# Patient Record
Sex: FEMALE | Race: WHITE
[De-identification: names, ages, dates, MRNs, and addresses within clinical notes are randomized per-mention and may not be internally consistent; named-entity substitution may affect disease eponyms.]

---

## 2017-07-07 ENCOUNTER — HOSPITAL ENCOUNTER (INPATIENT)
Dept: HOSPITAL 15 - ER | Age: 67
LOS: 1 days | Discharge: HOME | DRG: 441 | End: 2017-07-08
Attending: INTERNAL MEDICINE | Admitting: NURSE PRACTITIONER
Payer: COMMERCIAL

## 2017-07-07 VITALS — SYSTOLIC BLOOD PRESSURE: 134 MMHG | DIASTOLIC BLOOD PRESSURE: 56 MMHG

## 2017-07-07 VITALS — DIASTOLIC BLOOD PRESSURE: 50 MMHG | SYSTOLIC BLOOD PRESSURE: 126 MMHG

## 2017-07-07 VITALS — BODY MASS INDEX: 41.77 KG/M2 | WEIGHT: 259.93 LBS | HEIGHT: 66 IN

## 2017-07-07 DIAGNOSIS — G93.41: ICD-10-CM

## 2017-07-07 DIAGNOSIS — Z87.11: ICD-10-CM

## 2017-07-07 DIAGNOSIS — Z82.49: ICD-10-CM

## 2017-07-07 DIAGNOSIS — B19.20: ICD-10-CM

## 2017-07-07 DIAGNOSIS — I11.0: ICD-10-CM

## 2017-07-07 DIAGNOSIS — I50.9: ICD-10-CM

## 2017-07-07 DIAGNOSIS — Z90.710: ICD-10-CM

## 2017-07-07 DIAGNOSIS — E11.9: ICD-10-CM

## 2017-07-07 DIAGNOSIS — Z83.3: ICD-10-CM

## 2017-07-07 DIAGNOSIS — K72.90: Primary | ICD-10-CM

## 2017-07-07 DIAGNOSIS — I67.2: ICD-10-CM

## 2017-07-07 DIAGNOSIS — J44.9: ICD-10-CM

## 2017-07-07 DIAGNOSIS — N39.0: ICD-10-CM

## 2017-07-07 DIAGNOSIS — I73.9: ICD-10-CM

## 2017-07-07 DIAGNOSIS — Z90.89: ICD-10-CM

## 2017-07-07 DIAGNOSIS — K74.60: ICD-10-CM

## 2017-07-07 DIAGNOSIS — I25.2: ICD-10-CM

## 2017-07-07 DIAGNOSIS — Z82.3: ICD-10-CM

## 2017-07-07 DIAGNOSIS — I85.00: ICD-10-CM

## 2017-07-07 DIAGNOSIS — K42.9: ICD-10-CM

## 2017-07-07 DIAGNOSIS — M19.90: ICD-10-CM

## 2017-07-07 LAB
ALBUMIN SERPL-MCNC: 2.6 G/DL (ref 3.4–5)
ALP SERPL-CCNC: 126 U/L (ref 45–117)
AMYLASE SERPL-CCNC: 55 U/L (ref 25–115)
ANION GAP SERPL CALCULATED.3IONS-SCNC: 9 MMOL/L (ref 5–15)
APTT PPP: 30.1 SEC (ref 22.64–33.71)
BILIRUB SERPL-MCNC: 4.2 MG/DL (ref 0.2–1)
BNP SERPL-MCNC: 265.34 PG/ML (ref 0–100)
BUN SERPL-MCNC: 10 MG/DL (ref 7–18)
BUN/CREAT SERPL: 12.7
CALCIUM SERPL-MCNC: 7.9 MG/DL (ref 8.5–10.1)
CHLORIDE SERPL-SCNC: 111 MMOL/L (ref 98–107)
CO2 SERPL-SCNC: 23 MMOL/L (ref 21–32)
DEFINITIVE: (no result)
GLUCOSE SERPL-MCNC: 90 MG/DL (ref 74–106)
HCT VFR BLD AUTO: 36.1 % (ref 36–46)
HGB BLD-MCNC: 12.1 G/DL (ref 12.2–16.2)
INR PPP: 1.16 (ref 0.9–1.15)
MAGNESIUM SERPL-MCNC: 1.8 MG/DL (ref 1.6–2.6)
MCH RBC QN AUTO: 35.2 PG (ref 28–32)
MCV RBC AUTO: 104.8 FL (ref 80–100)
NRBC BLD QL AUTO: 0 %
POTASSIUM SERPL-SCNC: 4.4 MMOL/L (ref 3.5–5.1)
PROT SERPL-MCNC: 6.6 G/DL (ref 6.4–8.2)
PROTHROMBIN TIME: 12.7 SEC (ref 9.37–12.3)
SODIUM SERPL-SCNC: 143 MMOL/L (ref 136–145)
TEMPERATURE:: 23.5 C (ref 20–25)

## 2017-07-07 PROCEDURE — 80053 COMPREHEN METABOLIC PANEL: CPT

## 2017-07-07 PROCEDURE — 71010: CPT

## 2017-07-07 PROCEDURE — 83735 ASSAY OF MAGNESIUM: CPT

## 2017-07-07 PROCEDURE — 82140 ASSAY OF AMMONIA: CPT

## 2017-07-07 PROCEDURE — 87081 CULTURE SCREEN ONLY: CPT

## 2017-07-07 PROCEDURE — 96375 TX/PRO/DX INJ NEW DRUG ADDON: CPT

## 2017-07-07 PROCEDURE — 85730 THROMBOPLASTIN TIME PARTIAL: CPT

## 2017-07-07 PROCEDURE — 85025 COMPLETE CBC W/AUTO DIFF WBC: CPT

## 2017-07-07 PROCEDURE — 82150 ASSAY OF AMYLASE: CPT

## 2017-07-07 PROCEDURE — 96365 THER/PROPH/DIAG IV INF INIT: CPT

## 2017-07-07 PROCEDURE — 51702 INSERT TEMP BLADDER CATH: CPT

## 2017-07-07 PROCEDURE — 82962 GLUCOSE BLOOD TEST: CPT

## 2017-07-07 PROCEDURE — 85610 PROTHROMBIN TIME: CPT

## 2017-07-07 PROCEDURE — 93005 ELECTROCARDIOGRAM TRACING: CPT

## 2017-07-07 PROCEDURE — 84484 ASSAY OF TROPONIN QUANT: CPT

## 2017-07-07 PROCEDURE — 87040 BLOOD CULTURE FOR BACTERIA: CPT

## 2017-07-07 PROCEDURE — 81001 URINALYSIS AUTO W/SCOPE: CPT

## 2017-07-07 PROCEDURE — 83605 ASSAY OF LACTIC ACID: CPT

## 2017-07-07 PROCEDURE — 83880 ASSAY OF NATRIURETIC PEPTIDE: CPT

## 2017-07-07 PROCEDURE — 74176 CT ABD & PELVIS W/O CONTRAST: CPT

## 2017-07-07 PROCEDURE — 70450 CT HEAD/BRAIN W/O DYE: CPT

## 2017-07-07 PROCEDURE — 36415 COLL VENOUS BLD VENIPUNCTURE: CPT

## 2017-07-07 RX ADMIN — ACETAMINOPHEN PRN MG: 325 TABLET ORAL at 09:47

## 2017-07-07 RX ADMIN — LOSARTAN POTASSIUM SCH MG: 25 TABLET, FILM COATED ORAL at 10:51

## 2017-07-07 RX ADMIN — ACETAMINOPHEN PRN MG: 325 TABLET ORAL at 08:50

## 2017-07-07 RX ADMIN — HUMAN INSULIN SCH UNITS: 100 INJECTION, SOLUTION SUBCUTANEOUS at 18:00

## 2017-07-07 RX ADMIN — Medication SCH ML: at 06:55

## 2017-07-07 RX ADMIN — Medication SCH STRIP: at 06:17

## 2017-07-07 RX ADMIN — METOPROLOL TARTRATE SCH MG: 25 TABLET, FILM COATED ORAL at 21:32

## 2017-07-07 RX ADMIN — HUMAN INSULIN SCH UNITS: 100 INJECTION, SOLUTION SUBCUTANEOUS at 12:00

## 2017-07-07 RX ADMIN — Medication SCH STRIP: at 18:26

## 2017-07-07 RX ADMIN — FUROSEMIDE SCH MG: 20 TABLET ORAL at 10:50

## 2017-07-07 RX ADMIN — HUMAN INSULIN SCH UNITS: 100 INJECTION, SOLUTION SUBCUTANEOUS at 06:00

## 2017-07-07 RX ADMIN — Medication SCH ML: at 10:53

## 2017-07-07 RX ADMIN — METOPROLOL TARTRATE SCH MG: 25 TABLET, FILM COATED ORAL at 10:51

## 2017-07-07 RX ADMIN — Medication SCH STRIP: at 12:27

## 2017-07-07 RX ADMIN — Medication SCH ML: at 21:32

## 2017-07-07 RX ADMIN — PANTOPRAZOLE SODIUM SCH MG: 40 INJECTION, POWDER, FOR SOLUTION INTRAVENOUS at 10:49

## 2017-07-08 VITALS — DIASTOLIC BLOOD PRESSURE: 59 MMHG | SYSTOLIC BLOOD PRESSURE: 138 MMHG

## 2017-07-08 VITALS — SYSTOLIC BLOOD PRESSURE: 144 MMHG | DIASTOLIC BLOOD PRESSURE: 64 MMHG

## 2017-07-08 VITALS — DIASTOLIC BLOOD PRESSURE: 64 MMHG | SYSTOLIC BLOOD PRESSURE: 144 MMHG

## 2017-07-08 VITALS — SYSTOLIC BLOOD PRESSURE: 138 MMHG | DIASTOLIC BLOOD PRESSURE: 59 MMHG

## 2017-07-08 VITALS — SYSTOLIC BLOOD PRESSURE: 128 MMHG | DIASTOLIC BLOOD PRESSURE: 54 MMHG

## 2017-07-08 LAB
ALBUMIN SERPL-MCNC: 2.2 G/DL (ref 3.4–5)
ALP SERPL-CCNC: 113 U/L (ref 45–117)
ANION GAP SERPL CALCULATED.3IONS-SCNC: 8 MMOL/L (ref 5–15)
BILIRUB SERPL-MCNC: 3.5 MG/DL (ref 0.2–1)
BUN SERPL-MCNC: 13 MG/DL (ref 7–18)
BUN/CREAT SERPL: 16.5
CALCIUM SERPL-MCNC: 7.4 MG/DL (ref 8.5–10.1)
CHLORIDE SERPL-SCNC: 114 MMOL/L (ref 98–107)
CO2 SERPL-SCNC: 22 MMOL/L (ref 21–32)
DEFINITIVE: (no result)
GLUCOSE SERPL-MCNC: 87 MG/DL (ref 74–106)
HCT VFR BLD AUTO: 34.8 % (ref 36–46)
HGB BLD-MCNC: 11.6 G/DL (ref 12.2–16.2)
MCH RBC QN AUTO: 35.3 PG (ref 28–32)
MCV RBC AUTO: 106 FL (ref 80–100)
NRBC BLD QL AUTO: 0 %
POTASSIUM SERPL-SCNC: 4.2 MMOL/L (ref 3.5–5.1)
PROT SERPL-MCNC: 5.8 G/DL (ref 6.4–8.2)
SODIUM SERPL-SCNC: 144 MMOL/L (ref 136–145)

## 2017-07-08 RX ADMIN — HUMAN INSULIN SCH UNITS: 100 INJECTION, SOLUTION SUBCUTANEOUS at 00:00

## 2017-07-08 RX ADMIN — Medication SCH ML: at 10:00

## 2017-07-08 RX ADMIN — Medication SCH STRIP: at 00:00

## 2017-07-08 RX ADMIN — Medication SCH STRIP: at 12:14

## 2017-07-08 RX ADMIN — METOPROLOL TARTRATE SCH MG: 25 TABLET, FILM COATED ORAL at 10:01

## 2017-07-08 RX ADMIN — PANTOPRAZOLE SODIUM SCH MG: 40 INJECTION, POWDER, FOR SOLUTION INTRAVENOUS at 10:00

## 2017-07-08 RX ADMIN — LOSARTAN POTASSIUM SCH MG: 25 TABLET, FILM COATED ORAL at 10:02

## 2017-07-08 RX ADMIN — Medication SCH STRIP: at 06:17

## 2017-07-08 RX ADMIN — HUMAN INSULIN SCH UNITS: 100 INJECTION, SOLUTION SUBCUTANEOUS at 06:00

## 2017-07-08 RX ADMIN — FUROSEMIDE SCH MG: 20 TABLET ORAL at 10:02

## 2017-07-08 RX ADMIN — HUMAN INSULIN SCH UNITS: 100 INJECTION, SOLUTION SUBCUTANEOUS at 12:00

## 2017-07-08 RX ADMIN — ACETAMINOPHEN AND CODEINE PHOSPHATE PRN TAB: 300; 30 TABLET ORAL at 10:02

## 2017-07-08 RX ADMIN — ACETAMINOPHEN AND CODEINE PHOSPHATE PRN TAB: 300; 30 TABLET ORAL at 00:36

## 2017-11-05 ENCOUNTER — HOSPITAL ENCOUNTER (INPATIENT)
Dept: HOSPITAL 15 - ER | Age: 67
LOS: 3 days | Discharge: HOME HEALTH | DRG: 441 | End: 2017-11-08
Attending: INTERNAL MEDICINE | Admitting: INTERNAL MEDICINE
Payer: COMMERCIAL

## 2017-11-05 VITALS — BODY MASS INDEX: 40.59 KG/M2 | WEIGHT: 258.6 LBS | HEIGHT: 67 IN

## 2017-11-05 VITALS — DIASTOLIC BLOOD PRESSURE: 93 MMHG | SYSTOLIC BLOOD PRESSURE: 127 MMHG

## 2017-11-05 DIAGNOSIS — G40.909: ICD-10-CM

## 2017-11-05 DIAGNOSIS — R18.8: ICD-10-CM

## 2017-11-05 DIAGNOSIS — N39.0: ICD-10-CM

## 2017-11-05 DIAGNOSIS — E43: ICD-10-CM

## 2017-11-05 DIAGNOSIS — Z82.49: ICD-10-CM

## 2017-11-05 DIAGNOSIS — I50.33: ICD-10-CM

## 2017-11-05 DIAGNOSIS — G92: ICD-10-CM

## 2017-11-05 DIAGNOSIS — E11.9: ICD-10-CM

## 2017-11-05 DIAGNOSIS — B18.2: ICD-10-CM

## 2017-11-05 DIAGNOSIS — Z83.3: ICD-10-CM

## 2017-11-05 DIAGNOSIS — D68.9: ICD-10-CM
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
## 2017-11-05 DIAGNOSIS — I25.2: ICD-10-CM

## 2017-11-05 DIAGNOSIS — D69.6: ICD-10-CM

## 2017-11-05 DIAGNOSIS — K72.90: Primary | ICD-10-CM

## 2017-11-05 DIAGNOSIS — F12.20: ICD-10-CM

## 2017-11-05 DIAGNOSIS — I11.0: ICD-10-CM

## 2017-11-05 DIAGNOSIS — M54.5: ICD-10-CM

## 2017-11-05 DIAGNOSIS — S12.001A: ICD-10-CM

## 2017-11-05 DIAGNOSIS — K57.30: ICD-10-CM
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
## 2017-11-05 DIAGNOSIS — Z87.11: ICD-10-CM

## 2017-11-05 DIAGNOSIS — S12.9XXA: ICD-10-CM

## 2017-11-05 DIAGNOSIS — N17.9: ICD-10-CM

## 2017-11-05 DIAGNOSIS — F15.20: ICD-10-CM

## 2017-11-05 DIAGNOSIS — M51.36: ICD-10-CM

## 2017-11-05 DIAGNOSIS — E66.01: ICD-10-CM

## 2017-11-05 DIAGNOSIS — Z98.1: ICD-10-CM

## 2017-11-05 DIAGNOSIS — K74.60: ICD-10-CM

## 2017-11-05 DIAGNOSIS — J98.11: ICD-10-CM
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
## 2017-11-05 DIAGNOSIS — G89.29: ICD-10-CM

## 2017-11-05 DIAGNOSIS — Z82.3: ICD-10-CM

## 2017-11-05 DIAGNOSIS — M48.02: ICD-10-CM
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
## 2017-11-05 DIAGNOSIS — F41.9: ICD-10-CM

## 2017-11-05 DIAGNOSIS — J18.9: ICD-10-CM

## 2017-11-05 LAB
ALBUMIN SERPL-MCNC: 2.5 G/DL (ref 3.4–5)
ALCOHOL, URINE: 4 MG/DL (ref 0–5)
ALP SERPL-CCNC: 135 U/L (ref 45–117)
ALT SERPL-CCNC: 47 U/L (ref 13–56)
AMPHETAMINES UR QL SCN: POSITIVE
AMYLASE SERPL-CCNC: 54 U/L (ref 25–115)
ANION GAP SERPL CALCULATED.3IONS-SCNC: 8 MMOL/L (ref 5–15)
APTT PPP: 29.2 SEC (ref 22.64–33.71)
BARBITURATES UR QL SCN: NEGATIVE
BENZODIAZ UR QL SCN: NEGATIVE
BILIRUB SERPL-MCNC: 2.8 MG/DL (ref 0.2–1)
BUN SERPL-MCNC: 12 MG/DL (ref 7–18)
BUN/CREAT SERPL: 13.5
BZE UR QL SCN: NEGATIVE
CALCIUM SERPL-MCNC: 8.2 MG/DL (ref 8.5–10.1)
CANNABINOIDS UR QL SCN: POSITIVE
CHLORIDE SERPL-SCNC: 105 MMOL/L (ref 98–107)
CO2 SERPL-SCNC: 26 MMOL/L (ref 21–32)
GLUCOSE SERPL-MCNC: 124 MG/DL (ref 74–106)
HCT VFR BLD AUTO: 37.9 % (ref 36–46)
HGB BLD-MCNC: 13 G/DL (ref 12.2–16.2)
INR PPP: 1.3 (ref 0.9–1.15)
LIPASE SERPL-CCNC: 214 U/L (ref 73–393)
MCH RBC QN AUTO: 35.6 PG (ref 28–32)
MCV RBC AUTO: 103.5 FL (ref 80–100)
NRBC BLD QL AUTO: 0.1 %
OPIATES UR QL SCN: NEGATIVE
PCP UR QL SCN: NEGATIVE
POTASSIUM SERPL-SCNC: 3.6 MMOL/L (ref 3.5–5.1)
PROT SERPL-MCNC: 6.3 G/DL (ref 6.4–8.2)
PROTHROMBIN TIME: 14.2 SEC (ref 9.37–12.3)
SODIUM SERPL-SCNC: 139 MMOL/L (ref 136–145)

## 2017-11-05 PROCEDURE — 87086 URINE CULTURE/COLONY COUNT: CPT

## 2017-11-05 PROCEDURE — 87040 BLOOD CULTURE FOR BACTERIA: CPT

## 2017-11-05 PROCEDURE — 80048 BASIC METABOLIC PNL TOTAL CA: CPT

## 2017-11-05 PROCEDURE — 82140 ASSAY OF AMMONIA: CPT

## 2017-11-05 PROCEDURE — 87077 CULTURE AEROBIC IDENTIFY: CPT

## 2017-11-05 PROCEDURE — 87081 CULTURE SCREEN ONLY: CPT

## 2017-11-05 PROCEDURE — 84484 ASSAY OF TROPONIN QUANT: CPT

## 2017-11-05 PROCEDURE — 80053 COMPREHEN METABOLIC PANEL: CPT

## 2017-11-05 PROCEDURE — 96361 HYDRATE IV INFUSION ADD-ON: CPT

## 2017-11-05 PROCEDURE — 85730 THROMBOPLASTIN TIME PARTIAL: CPT

## 2017-11-05 PROCEDURE — 36415 COLL VENOUS BLD VENIPUNCTURE: CPT

## 2017-11-05 PROCEDURE — 94640 AIRWAY INHALATION TREATMENT: CPT

## 2017-11-05 PROCEDURE — 82150 ASSAY OF AMYLASE: CPT

## 2017-11-05 PROCEDURE — 82746 ASSAY OF FOLIC ACID SERUM: CPT

## 2017-11-05 PROCEDURE — 82607 VITAMIN B-12: CPT

## 2017-11-05 PROCEDURE — 80307 DRUG TEST PRSMV CHEM ANLYZR: CPT

## 2017-11-05 PROCEDURE — 72125 CT NECK SPINE W/O DYE: CPT

## 2017-11-05 PROCEDURE — 87088 URINE BACTERIA CULTURE: CPT

## 2017-11-05 PROCEDURE — 83605 ASSAY OF LACTIC ACID: CPT

## 2017-11-05 PROCEDURE — 93005 ELECTROCARDIOGRAM TRACING: CPT

## 2017-11-05 PROCEDURE — 74176 CT ABD & PELVIS W/O CONTRAST: CPT

## 2017-11-05 PROCEDURE — 70450 CT HEAD/BRAIN W/O DYE: CPT

## 2017-11-05 PROCEDURE — 71010: CPT

## 2017-11-05 PROCEDURE — 87186 SC STD MICRODIL/AGAR DIL: CPT

## 2017-11-05 PROCEDURE — 83735 ASSAY OF MAGNESIUM: CPT

## 2017-11-05 PROCEDURE — 82962 GLUCOSE BLOOD TEST: CPT

## 2017-11-05 PROCEDURE — 84443 ASSAY THYROID STIM HORMONE: CPT

## 2017-11-05 PROCEDURE — 95819 EEG AWAKE AND ASLEEP: CPT

## 2017-11-05 PROCEDURE — 82550 ASSAY OF CK (CPK): CPT

## 2017-11-05 PROCEDURE — 72040 X-RAY EXAM NECK SPINE 2-3 VW: CPT

## 2017-11-05 PROCEDURE — 96365 THER/PROPH/DIAG IV INF INIT: CPT

## 2017-11-05 PROCEDURE — 83880 ASSAY OF NATRIURETIC PEPTIDE: CPT

## 2017-11-05 PROCEDURE — 83036 HEMOGLOBIN GLYCOSYLATED A1C: CPT

## 2017-11-05 PROCEDURE — 51702 INSERT TEMP BLADDER CATH: CPT

## 2017-11-05 PROCEDURE — 80061 LIPID PANEL: CPT

## 2017-11-05 PROCEDURE — 81001 URINALYSIS AUTO W/SCOPE: CPT

## 2017-11-05 PROCEDURE — 85610 PROTHROMBIN TIME: CPT

## 2017-11-05 PROCEDURE — 85025 COMPLETE CBC W/AUTO DIFF WBC: CPT

## 2017-11-05 PROCEDURE — 96375 TX/PRO/DX INJ NEW DRUG ADDON: CPT

## 2017-11-05 PROCEDURE — 83690 ASSAY OF LIPASE: CPT

## 2017-11-05 RX ADMIN — PIPERACILLIN SODIUM AND TAZOBACTAM SODIUM SCH MLS/HR: .375; 3 INJECTION, POWDER, LYOPHILIZED, FOR SOLUTION INTRAVENOUS at 14:58

## 2017-11-05 RX ADMIN — SPIRONOLACTONE SCH MG: 25 TABLET, FILM COATED ORAL at 16:41

## 2017-11-05 RX ADMIN — Medication SCH ML: at 15:05

## 2017-11-05 RX ADMIN — PIPERACILLIN SODIUM AND TAZOBACTAM SODIUM SCH MLS/HR: .375; 3 INJECTION, POWDER, LYOPHILIZED, FOR SOLUTION INTRAVENOUS at 20:34

## 2017-11-05 RX ADMIN — METOPROLOL TARTRATE SCH MG: 25 TABLET, FILM COATED ORAL at 22:18

## 2017-11-05 RX ADMIN — HUMAN INSULIN SCH UNITS: 100 INJECTION, SOLUTION SUBCUTANEOUS at 22:00

## 2017-11-05 RX ADMIN — HUMAN INSULIN SCH UNITS: 100 INJECTION, SOLUTION SUBCUTANEOUS at 17:00

## 2017-11-05 RX ADMIN — Medication SCH STRIP: at 22:00

## 2017-11-05 RX ADMIN — SODIUM CHLORIDE SCH ML: 9 INJECTION INTRAMUSCULAR; INTRAVENOUS; SUBCUTANEOUS at 22:17

## 2017-11-05 RX ADMIN — Medication SCH ML: at 20:34

## 2017-11-05 RX ADMIN — Medication SCH STRIP: at 18:41

## 2017-11-06 VITALS — SYSTOLIC BLOOD PRESSURE: 137 MMHG | DIASTOLIC BLOOD PRESSURE: 48 MMHG

## 2017-11-06 VITALS — DIASTOLIC BLOOD PRESSURE: 88 MMHG | SYSTOLIC BLOOD PRESSURE: 147 MMHG

## 2017-11-06 VITALS — SYSTOLIC BLOOD PRESSURE: 142 MMHG | DIASTOLIC BLOOD PRESSURE: 52 MMHG

## 2017-11-06 VITALS — DIASTOLIC BLOOD PRESSURE: 44 MMHG | SYSTOLIC BLOOD PRESSURE: 136 MMHG

## 2017-11-06 VITALS — SYSTOLIC BLOOD PRESSURE: 113 MMHG | DIASTOLIC BLOOD PRESSURE: 50 MMHG

## 2017-11-06 LAB
ALBUMIN SERPL-MCNC: 2.3 G/DL (ref 3.4–5)
ALP SERPL-CCNC: 113 U/L (ref 45–117)
ALT SERPL-CCNC: 40 U/L (ref 13–56)
ANION GAP SERPL CALCULATED.3IONS-SCNC: 8 MMOL/L (ref 5–15)
BILIRUB SERPL-MCNC: 4.7 MG/DL (ref 0.2–1)
BUN SERPL-MCNC: 20 MG/DL (ref 7–18)
BUN/CREAT SERPL: 15.4
CALCIUM SERPL-MCNC: 7.8 MG/DL (ref 8.5–10.1)
CHLORIDE SERPL-SCNC: 104 MMOL/L (ref 98–107)
CHOLEST SERPL-MCNC: 108 MG/DL (ref ?–200)
CO2 SERPL-SCNC: 27 MMOL/L (ref 21–32)
GLUCOSE SERPL-MCNC: 122 MG/DL (ref 74–106)
HCT VFR BLD AUTO: 36.3 % (ref 36–46)
HDLC SERPL-MCNC: 25 MG/DL (ref 40–59)
HGB BLD-MCNC: 12.6 G/DL (ref 12.2–16.2)
MCH RBC QN AUTO: 36.2 PG (ref 28–32)
MCV RBC AUTO: 104.6 FL (ref 80–100)
NRBC BLD QL AUTO: 0.1 %
POTASSIUM SERPL-SCNC: 3.6 MMOL/L (ref 3.5–5.1)
PROT SERPL-MCNC: 6 G/DL (ref 6.4–8.2)
SODIUM SERPL-SCNC: 139 MMOL/L (ref 136–145)
TRIGL SERPL-MCNC: 94 MG/DL (ref ?–150)

## 2017-11-06 RX ADMIN — HUMAN INSULIN SCH UNITS: 100 INJECTION, SOLUTION SUBCUTANEOUS at 17:00

## 2017-11-06 RX ADMIN — METOPROLOL TARTRATE SCH MG: 25 TABLET, FILM COATED ORAL at 21:55

## 2017-11-06 RX ADMIN — LOSARTAN POTASSIUM SCH MG: 25 TABLET, FILM COATED ORAL at 15:28

## 2017-11-06 RX ADMIN — Medication SCH STRIP: at 22:08

## 2017-11-06 RX ADMIN — SODIUM CHLORIDE PRN MG: 9 INJECTION, SOLUTION INTRAVENOUS at 19:10

## 2017-11-06 RX ADMIN — Medication SCH STRIP: at 11:30

## 2017-11-06 RX ADMIN — Medication SCH STRIP: at 17:00

## 2017-11-06 RX ADMIN — PIPERACILLIN SODIUM AND TAZOBACTAM SODIUM SCH MLS/HR: .375; 3 INJECTION, POWDER, LYOPHILIZED, FOR SOLUTION INTRAVENOUS at 17:00

## 2017-11-06 RX ADMIN — HUMAN INSULIN SCH UNITS: 100 INJECTION, SOLUTION SUBCUTANEOUS at 22:09

## 2017-11-06 RX ADMIN — Medication SCH ML: at 03:11

## 2017-11-06 RX ADMIN — METOPROLOL TARTRATE SCH MG: 25 TABLET, FILM COATED ORAL at 15:29

## 2017-11-06 RX ADMIN — SODIUM CHLORIDE SCH ML: 9 INJECTION INTRAMUSCULAR; INTRAVENOUS; SUBCUTANEOUS at 21:55

## 2017-11-06 RX ADMIN — HUMAN INSULIN SCH UNITS: 100 INJECTION, SOLUTION SUBCUTANEOUS at 11:30

## 2017-11-06 RX ADMIN — SODIUM CHLORIDE SCH ML: 9 INJECTION INTRAMUSCULAR; INTRAVENOUS; SUBCUTANEOUS at 14:00

## 2017-11-06 RX ADMIN — Medication SCH ML: at 20:48

## 2017-11-06 RX ADMIN — SPIRONOLACTONE SCH MG: 25 TABLET, FILM COATED ORAL at 06:28

## 2017-11-06 RX ADMIN — SODIUM CHLORIDE PRN MG: 9 INJECTION, SOLUTION INTRAVENOUS at 09:37

## 2017-11-06 RX ADMIN — PIPERACILLIN SODIUM AND TAZOBACTAM SODIUM SCH MLS/HR: .375; 3 INJECTION, POWDER, LYOPHILIZED, FOR SOLUTION INTRAVENOUS at 14:55

## 2017-11-06 RX ADMIN — PIPERACILLIN SODIUM AND TAZOBACTAM SODIUM SCH MLS/HR: .375; 3 INJECTION, POWDER, LYOPHILIZED, FOR SOLUTION INTRAVENOUS at 03:11

## 2017-11-06 RX ADMIN — Medication SCH STRIP: at 07:11

## 2017-11-06 RX ADMIN — SPIRONOLACTONE SCH MG: 25 TABLET, FILM COATED ORAL at 18:00

## 2017-11-06 RX ADMIN — Medication SCH ML: at 09:27

## 2017-11-06 RX ADMIN — HUMAN INSULIN SCH UNITS: 100 INJECTION, SOLUTION SUBCUTANEOUS at 07:00

## 2017-11-06 RX ADMIN — Medication SCH ML: at 14:30

## 2017-11-06 RX ADMIN — PANTOPRAZOLE SODIUM SCH MG: 40 TABLET, DELAYED RELEASE ORAL at 15:29

## 2017-11-06 RX ADMIN — PIPERACILLIN SODIUM AND TAZOBACTAM SODIUM SCH MLS/HR: .375; 3 INJECTION, POWDER, LYOPHILIZED, FOR SOLUTION INTRAVENOUS at 20:48

## 2017-11-06 RX ADMIN — BUDESONIDE SCH MG: 0.5 SUSPENSION RESPIRATORY (INHALATION) at 22:00

## 2017-11-06 RX ADMIN — SODIUM CHLORIDE SCH ML: 9 INJECTION INTRAMUSCULAR; INTRAVENOUS; SUBCUTANEOUS at 06:26

## 2017-11-07 VITALS — SYSTOLIC BLOOD PRESSURE: 120 MMHG | DIASTOLIC BLOOD PRESSURE: 94 MMHG

## 2017-11-07 VITALS — SYSTOLIC BLOOD PRESSURE: 129 MMHG | DIASTOLIC BLOOD PRESSURE: 43 MMHG

## 2017-11-07 VITALS — SYSTOLIC BLOOD PRESSURE: 120 MMHG | DIASTOLIC BLOOD PRESSURE: 55 MMHG

## 2017-11-07 VITALS — SYSTOLIC BLOOD PRESSURE: 118 MMHG | DIASTOLIC BLOOD PRESSURE: 46 MMHG

## 2017-11-07 VITALS — DIASTOLIC BLOOD PRESSURE: 54 MMHG | SYSTOLIC BLOOD PRESSURE: 129 MMHG

## 2017-11-07 VITALS — DIASTOLIC BLOOD PRESSURE: 44 MMHG | SYSTOLIC BLOOD PRESSURE: 136 MMHG

## 2017-11-07 LAB
ANION GAP SERPL CALCULATED.3IONS-SCNC: 7 MMOL/L (ref 5–15)
BUN SERPL-MCNC: 23 MG/DL (ref 7–18)
BUN/CREAT SERPL: 18.1
CALCIUM SERPL-MCNC: 7.7 MG/DL (ref 8.5–10.1)
CHLORIDE SERPL-SCNC: 104 MMOL/L (ref 98–107)
CO2 SERPL-SCNC: 28 MMOL/L (ref 21–32)
GLUCOSE SERPL-MCNC: 117 MG/DL (ref 74–106)
HCT VFR BLD AUTO: 34.2 % (ref 36–46)
HGB BLD-MCNC: 11.9 G/DL (ref 12.2–16.2)
MAGNESIUM SERPL-MCNC: 2 MG/DL (ref 1.6–2.6)
MCH RBC QN AUTO: 36.7 PG (ref 28–32)
MCV RBC AUTO: 105.1 FL (ref 80–100)
NRBC BLD QL AUTO: 0.1 %
POTASSIUM SERPL-SCNC: 3.5 MMOL/L (ref 3.5–5.1)
SODIUM SERPL-SCNC: 139 MMOL/L (ref 136–145)

## 2017-11-07 RX ADMIN — PANTOPRAZOLE SODIUM SCH MG: 40 TABLET, DELAYED RELEASE ORAL at 10:00

## 2017-11-07 RX ADMIN — Medication SCH ML: at 08:50

## 2017-11-07 RX ADMIN — Medication SCH STRIP: at 18:22

## 2017-11-07 RX ADMIN — BUDESONIDE SCH MG: 0.5 SUSPENSION RESPIRATORY (INHALATION) at 10:19

## 2017-11-07 RX ADMIN — Medication SCH STRIP: at 11:30

## 2017-11-07 RX ADMIN — FUROSEMIDE SCH MG: 10 INJECTION, SOLUTION INTRAMUSCULAR; INTRAVENOUS at 08:50

## 2017-11-07 RX ADMIN — METOPROLOL TARTRATE SCH MG: 25 TABLET, FILM COATED ORAL at 21:45

## 2017-11-07 RX ADMIN — HUMAN INSULIN SCH UNITS: 100 INJECTION, SOLUTION SUBCUTANEOUS at 17:00

## 2017-11-07 RX ADMIN — HUMAN INSULIN SCH UNITS: 100 INJECTION, SOLUTION SUBCUTANEOUS at 22:00

## 2017-11-07 RX ADMIN — SODIUM CHLORIDE SCH ML: 9 INJECTION INTRAMUSCULAR; INTRAVENOUS; SUBCUTANEOUS at 21:45

## 2017-11-07 RX ADMIN — Medication SCH ML: at 14:30

## 2017-11-07 RX ADMIN — Medication SCH ML: at 03:46

## 2017-11-07 RX ADMIN — SODIUM CHLORIDE SCH ML: 9 INJECTION INTRAMUSCULAR; INTRAVENOUS; SUBCUTANEOUS at 18:20

## 2017-11-07 RX ADMIN — Medication SCH ML: at 20:30

## 2017-11-07 RX ADMIN — PIPERACILLIN SODIUM AND TAZOBACTAM SODIUM SCH MLS/HR: .375; 3 INJECTION, POWDER, LYOPHILIZED, FOR SOLUTION INTRAVENOUS at 08:49

## 2017-11-07 RX ADMIN — METOPROLOL TARTRATE SCH MG: 25 TABLET, FILM COATED ORAL at 08:51

## 2017-11-07 RX ADMIN — HUMAN INSULIN SCH UNITS: 100 INJECTION, SOLUTION SUBCUTANEOUS at 07:00

## 2017-11-07 RX ADMIN — SPIRONOLACTONE SCH MG: 25 TABLET, FILM COATED ORAL at 19:52

## 2017-11-07 RX ADMIN — LOSARTAN POTASSIUM SCH MG: 25 TABLET, FILM COATED ORAL at 08:51

## 2017-11-07 RX ADMIN — PIPERACILLIN SODIUM AND TAZOBACTAM SODIUM SCH MLS/HR: .375; 3 INJECTION, POWDER, LYOPHILIZED, FOR SOLUTION INTRAVENOUS at 03:45

## 2017-11-07 RX ADMIN — Medication SCH STRIP: at 07:00

## 2017-11-07 RX ADMIN — VANCOMYCIN HYDROCHLORIDE SCH MLS/HR: 1 INJECTION, POWDER, LYOPHILIZED, FOR SOLUTION INTRAVENOUS at 11:55

## 2017-11-07 RX ADMIN — BUDESONIDE SCH MG: 0.5 SUSPENSION RESPIRATORY (INHALATION) at 18:57

## 2017-11-07 RX ADMIN — HUMAN INSULIN SCH UNITS: 100 INJECTION, SOLUTION SUBCUTANEOUS at 11:30

## 2017-11-07 RX ADMIN — SODIUM CHLORIDE PRN MG: 9 INJECTION, SOLUTION INTRAVENOUS at 08:51

## 2017-11-07 RX ADMIN — SODIUM CHLORIDE SCH ML: 9 INJECTION INTRAMUSCULAR; INTRAVENOUS; SUBCUTANEOUS at 05:56

## 2017-11-07 RX ADMIN — SPIRONOLACTONE SCH MG: 25 TABLET, FILM COATED ORAL at 05:56

## 2017-11-07 RX ADMIN — Medication SCH STRIP: at 22:00

## 2017-11-07 RX ADMIN — VANCOMYCIN HYDROCHLORIDE SCH MLS/HR: 1 INJECTION, POWDER, LYOPHILIZED, FOR SOLUTION INTRAVENOUS at 23:00

## 2017-11-08 VITALS — DIASTOLIC BLOOD PRESSURE: 55 MMHG | SYSTOLIC BLOOD PRESSURE: 158 MMHG

## 2017-11-08 VITALS — DIASTOLIC BLOOD PRESSURE: 69 MMHG | SYSTOLIC BLOOD PRESSURE: 159 MMHG

## 2017-11-08 VITALS — SYSTOLIC BLOOD PRESSURE: 159 MMHG | DIASTOLIC BLOOD PRESSURE: 69 MMHG

## 2017-11-08 VITALS — SYSTOLIC BLOOD PRESSURE: 133 MMHG | DIASTOLIC BLOOD PRESSURE: 63 MMHG

## 2017-11-08 VITALS — DIASTOLIC BLOOD PRESSURE: 52 MMHG | SYSTOLIC BLOOD PRESSURE: 134 MMHG

## 2017-11-08 RX ADMIN — BUDESONIDE SCH MG: 0.5 SUSPENSION RESPIRATORY (INHALATION) at 11:00

## 2017-11-08 RX ADMIN — VANCOMYCIN HYDROCHLORIDE SCH MLS/HR: 1 INJECTION, POWDER, LYOPHILIZED, FOR SOLUTION INTRAVENOUS at 11:19

## 2017-11-08 RX ADMIN — Medication SCH STRIP: at 11:30

## 2017-11-08 RX ADMIN — Medication SCH ML: at 12:00

## 2017-11-08 RX ADMIN — PANTOPRAZOLE SODIUM SCH MG: 40 TABLET, DELAYED RELEASE ORAL at 08:57

## 2017-11-08 RX ADMIN — Medication SCH ML: at 02:30

## 2017-11-08 RX ADMIN — LOSARTAN POTASSIUM SCH MG: 25 TABLET, FILM COATED ORAL at 08:57

## 2017-11-08 RX ADMIN — SPIRONOLACTONE SCH MG: 25 TABLET, FILM COATED ORAL at 06:25

## 2017-11-08 RX ADMIN — Medication SCH STRIP: at 06:26

## 2017-11-08 RX ADMIN — HUMAN INSULIN SCH UNITS: 100 INJECTION, SOLUTION SUBCUTANEOUS at 06:25

## 2017-11-08 RX ADMIN — SODIUM CHLORIDE SCH ML: 9 INJECTION INTRAMUSCULAR; INTRAVENOUS; SUBCUTANEOUS at 14:00

## 2017-11-08 RX ADMIN — HUMAN INSULIN SCH UNITS: 100 INJECTION, SOLUTION SUBCUTANEOUS at 11:30

## 2017-11-08 RX ADMIN — SODIUM CHLORIDE PRN MG: 9 INJECTION, SOLUTION INTRAVENOUS at 15:53

## 2017-11-08 RX ADMIN — FUROSEMIDE SCH MG: 10 INJECTION, SOLUTION INTRAMUSCULAR; INTRAVENOUS at 10:38

## 2017-11-08 RX ADMIN — SODIUM CHLORIDE SCH ML: 9 INJECTION INTRAMUSCULAR; INTRAVENOUS; SUBCUTANEOUS at 06:25

## 2017-11-08 RX ADMIN — Medication SCH ML: at 06:00

## 2017-12-31 ENCOUNTER — HOSPITAL ENCOUNTER (INPATIENT)
Dept: HOSPITAL 15 - ER | Age: 67
LOS: 3 days | Discharge: HOME HEALTH | DRG: 871 | End: 2018-01-03
Attending: HOSPITALIST | Admitting: INTERNAL MEDICINE
Payer: COMMERCIAL

## 2017-12-31 VITALS — DIASTOLIC BLOOD PRESSURE: 61 MMHG | SYSTOLIC BLOOD PRESSURE: 136 MMHG

## 2017-12-31 VITALS — DIASTOLIC BLOOD PRESSURE: 54 MMHG | SYSTOLIC BLOOD PRESSURE: 138 MMHG

## 2017-12-31 VITALS — SYSTOLIC BLOOD PRESSURE: 136 MMHG | DIASTOLIC BLOOD PRESSURE: 61 MMHG

## 2017-12-31 VITALS — SYSTOLIC BLOOD PRESSURE: 138 MMHG | DIASTOLIC BLOOD PRESSURE: 54 MMHG

## 2017-12-31 VITALS — BODY MASS INDEX: 40.14 KG/M2 | WEIGHT: 255.74 LBS | HEIGHT: 67 IN

## 2017-12-31 DIAGNOSIS — B19.20: ICD-10-CM

## 2017-12-31 DIAGNOSIS — K72.90: ICD-10-CM

## 2017-12-31 DIAGNOSIS — Z82.49: ICD-10-CM

## 2017-12-31 DIAGNOSIS — Z90.710: ICD-10-CM

## 2017-12-31 DIAGNOSIS — G89.29: ICD-10-CM

## 2017-12-31 DIAGNOSIS — K74.60: ICD-10-CM

## 2017-12-31 DIAGNOSIS — F41.9: ICD-10-CM

## 2017-12-31 DIAGNOSIS — M19.90: ICD-10-CM

## 2017-12-31 DIAGNOSIS — Z71.89: ICD-10-CM

## 2017-12-31 DIAGNOSIS — E66.01: ICD-10-CM

## 2017-12-31 DIAGNOSIS — K57.30: ICD-10-CM

## 2017-12-31 DIAGNOSIS — Z90.89: ICD-10-CM

## 2017-12-31 DIAGNOSIS — G40.909: ICD-10-CM

## 2017-12-31 DIAGNOSIS — E43: ICD-10-CM

## 2017-12-31 DIAGNOSIS — D69.6: ICD-10-CM

## 2017-12-31 DIAGNOSIS — E11.9: ICD-10-CM

## 2017-12-31 DIAGNOSIS — K76.6: ICD-10-CM

## 2017-12-31 DIAGNOSIS — B96.20: ICD-10-CM

## 2017-12-31 DIAGNOSIS — Z98.1: ICD-10-CM

## 2017-12-31 DIAGNOSIS — I25.2: ICD-10-CM

## 2017-12-31 DIAGNOSIS — A41.9: Primary | ICD-10-CM

## 2017-12-31 DIAGNOSIS — Z82.3: ICD-10-CM

## 2017-12-31 DIAGNOSIS — Z83.3: ICD-10-CM

## 2017-12-31 DIAGNOSIS — G93.41: ICD-10-CM

## 2017-12-31 DIAGNOSIS — F12.90: ICD-10-CM

## 2017-12-31 DIAGNOSIS — Z91.19: ICD-10-CM

## 2017-12-31 DIAGNOSIS — E86.0: ICD-10-CM

## 2017-12-31 DIAGNOSIS — I11.0: ICD-10-CM

## 2017-12-31 DIAGNOSIS — Z87.11: ICD-10-CM

## 2017-12-31 DIAGNOSIS — M54.5: ICD-10-CM

## 2017-12-31 DIAGNOSIS — N30.90: ICD-10-CM

## 2017-12-31 DIAGNOSIS — I50.9: ICD-10-CM

## 2017-12-31 DIAGNOSIS — Z79.899: ICD-10-CM

## 2017-12-31 LAB
ALBUMIN SERPL-MCNC: 2.8 G/DL (ref 3.4–5)
ALP SERPL-CCNC: 135 U/L (ref 45–117)
ALT SERPL-CCNC: 48 U/L (ref 13–56)
AMYLASE SERPL-CCNC: 55 U/L (ref 25–115)
ANION GAP SERPL CALCULATED.3IONS-SCNC: 10 MMOL/L (ref 5–15)
APTT PPP: 41.1 SEC (ref 22.64–33.71)
BILIRUB SERPL-MCNC: 3.8 MG/DL (ref 0.2–1)
BUN SERPL-MCNC: 15 MG/DL (ref 7–18)
BUN/CREAT SERPL: 14.4
CALCIUM SERPL-MCNC: 8.3 MG/DL (ref 8.5–10.1)
CHLORIDE SERPL-SCNC: 109 MMOL/L (ref 98–107)
CO2 SERPL-SCNC: 24 MMOL/L (ref 21–32)
GLUCOSE SERPL-MCNC: 130 MG/DL (ref 74–106)
HCT VFR BLD AUTO: 38.7 % (ref 36–46)
HGB BLD-MCNC: 13.2 G/DL (ref 12.2–16.2)
INR PPP: 1.55 (ref 0.9–1.15)
LIPASE SERPL-CCNC: 123 U/L (ref 73–393)
MAGNESIUM SERPL-MCNC: 1.8 MG/DL (ref 1.6–2.6)
MCH RBC QN AUTO: 35.9 PG (ref 28–32)
MCV RBC AUTO: 105.1 FL (ref 80–100)
NRBC BLD QL AUTO: 0 %
POTASSIUM SERPL-SCNC: 3.5 MMOL/L (ref 3.5–5.1)
PROT SERPL-MCNC: 7.5 G/DL (ref 6.4–8.2)
PROTHROMBIN TIME: 17 SEC (ref 9.37–12.3)
SODIUM SERPL-SCNC: 143 MMOL/L (ref 136–145)
YEAST # UR AUTO: (no result) /HPF

## 2017-12-31 PROCEDURE — 70450 CT HEAD/BRAIN W/O DYE: CPT

## 2017-12-31 PROCEDURE — 82550 ASSAY OF CK (CPK): CPT

## 2017-12-31 PROCEDURE — 71010: CPT

## 2017-12-31 PROCEDURE — 95819 EEG AWAKE AND ASLEEP: CPT

## 2017-12-31 PROCEDURE — 96361 HYDRATE IV INFUSION ADD-ON: CPT

## 2017-12-31 PROCEDURE — 87081 CULTURE SCREEN ONLY: CPT

## 2017-12-31 PROCEDURE — 36415 COLL VENOUS BLD VENIPUNCTURE: CPT

## 2017-12-31 PROCEDURE — 82150 ASSAY OF AMYLASE: CPT

## 2017-12-31 PROCEDURE — 82746 ASSAY OF FOLIC ACID SERUM: CPT

## 2017-12-31 PROCEDURE — 85730 THROMBOPLASTIN TIME PARTIAL: CPT

## 2017-12-31 PROCEDURE — 84484 ASSAY OF TROPONIN QUANT: CPT

## 2017-12-31 PROCEDURE — 85610 PROTHROMBIN TIME: CPT

## 2017-12-31 PROCEDURE — 96374 THER/PROPH/DIAG INJ IV PUSH: CPT

## 2017-12-31 PROCEDURE — 81001 URINALYSIS AUTO W/SCOPE: CPT

## 2017-12-31 PROCEDURE — 74176 CT ABD & PELVIS W/O CONTRAST: CPT

## 2017-12-31 PROCEDURE — 82607 VITAMIN B-12: CPT

## 2017-12-31 PROCEDURE — 83690 ASSAY OF LIPASE: CPT

## 2017-12-31 PROCEDURE — 87186 SC STD MICRODIL/AGAR DIL: CPT

## 2017-12-31 PROCEDURE — 83735 ASSAY OF MAGNESIUM: CPT

## 2017-12-31 PROCEDURE — 87493 C DIFF AMPLIFIED PROBE: CPT

## 2017-12-31 PROCEDURE — 80053 COMPREHEN METABOLIC PANEL: CPT

## 2017-12-31 PROCEDURE — 87088 URINE BACTERIA CULTURE: CPT

## 2017-12-31 PROCEDURE — 80048 BASIC METABOLIC PNL TOTAL CA: CPT

## 2017-12-31 PROCEDURE — 82962 GLUCOSE BLOOD TEST: CPT

## 2017-12-31 PROCEDURE — 93005 ELECTROCARDIOGRAM TRACING: CPT

## 2017-12-31 PROCEDURE — 85025 COMPLETE CBC W/AUTO DIFF WBC: CPT

## 2017-12-31 PROCEDURE — 87086 URINE CULTURE/COLONY COUNT: CPT

## 2017-12-31 PROCEDURE — 97163 PT EVAL HIGH COMPLEX 45 MIN: CPT

## 2017-12-31 PROCEDURE — 84443 ASSAY THYROID STIM HORMONE: CPT

## 2017-12-31 PROCEDURE — 96375 TX/PRO/DX INJ NEW DRUG ADDON: CPT

## 2017-12-31 PROCEDURE — 82140 ASSAY OF AMMONIA: CPT

## 2017-12-31 RX ADMIN — Medication SCH ML: at 20:27

## 2017-12-31 RX ADMIN — Medication SCH ML: at 20:00

## 2017-12-31 RX ADMIN — PANTOPRAZOLE SODIUM SCH MG: 40 INJECTION, POWDER, FOR SOLUTION INTRAVENOUS at 22:00

## 2017-12-31 RX ADMIN — PROMETHAZINE HYDROCHLORIDE PRN MG: 25 INJECTION INTRAMUSCULAR; INTRAVENOUS at 20:42

## 2017-12-31 RX ADMIN — Medication SCH ML: at 17:00

## 2017-12-31 RX ADMIN — METOPROLOL TARTRATE SCH MG: 25 TABLET, FILM COATED ORAL at 22:00

## 2018-01-01 VITALS — DIASTOLIC BLOOD PRESSURE: 57 MMHG | SYSTOLIC BLOOD PRESSURE: 130 MMHG

## 2018-01-01 VITALS — DIASTOLIC BLOOD PRESSURE: 61 MMHG | SYSTOLIC BLOOD PRESSURE: 143 MMHG

## 2018-01-01 VITALS — SYSTOLIC BLOOD PRESSURE: 120 MMHG | DIASTOLIC BLOOD PRESSURE: 41 MMHG

## 2018-01-01 VITALS — DIASTOLIC BLOOD PRESSURE: 61 MMHG | SYSTOLIC BLOOD PRESSURE: 131 MMHG

## 2018-01-01 VITALS — SYSTOLIC BLOOD PRESSURE: 114 MMHG | DIASTOLIC BLOOD PRESSURE: 59 MMHG

## 2018-01-01 LAB
ALBUMIN SERPL-MCNC: 2.3 G/DL (ref 3.4–5)
ALP SERPL-CCNC: 112 U/L (ref 45–117)
ALT SERPL-CCNC: 38 U/L (ref 13–56)
ANION GAP SERPL CALCULATED.3IONS-SCNC: 7 MMOL/L (ref 5–15)
BILIRUB SERPL-MCNC: 3.6 MG/DL (ref 0.2–1)
BUN SERPL-MCNC: 22 MG/DL (ref 7–18)
BUN/CREAT SERPL: 21.2
CALCIUM SERPL-MCNC: 7.7 MG/DL (ref 8.5–10.1)
CHLORIDE SERPL-SCNC: 114 MMOL/L (ref 98–107)
CO2 SERPL-SCNC: 23 MMOL/L (ref 21–32)
GLUCOSE SERPL-MCNC: 105 MG/DL (ref 74–106)
HCT VFR BLD AUTO: 36.9 % (ref 36–46)
HGB BLD-MCNC: 12.5 G/DL (ref 12.2–16.2)
MCH RBC QN AUTO: 36.4 PG (ref 28–32)
MCV RBC AUTO: 107.7 FL (ref 80–100)
NRBC BLD QL AUTO: 0.2 %
POTASSIUM SERPL-SCNC: 3.8 MMOL/L (ref 3.5–5.1)
PROT SERPL-MCNC: 6.3 G/DL (ref 6.4–8.2)
SODIUM SERPL-SCNC: 144 MMOL/L (ref 136–145)

## 2018-01-01 RX ADMIN — Medication SCH ML: at 12:24

## 2018-01-01 RX ADMIN — Medication SCH ML: at 20:26

## 2018-01-01 RX ADMIN — Medication SCH ML: at 08:38

## 2018-01-01 RX ADMIN — Medication SCH ML: at 04:00

## 2018-01-01 RX ADMIN — LOSARTAN POTASSIUM SCH MG: 25 TABLET, FILM COATED ORAL at 10:48

## 2018-01-01 RX ADMIN — METOPROLOL TARTRATE SCH MG: 25 TABLET, FILM COATED ORAL at 10:48

## 2018-01-01 RX ADMIN — PROMETHAZINE HYDROCHLORIDE PRN MG: 25 INJECTION INTRAMUSCULAR; INTRAVENOUS at 04:29

## 2018-01-01 RX ADMIN — SODIUM CHLORIDE PRN MG: 9 INJECTION, SOLUTION INTRAVENOUS at 20:46

## 2018-01-01 RX ADMIN — Medication SCH ML: at 16:16

## 2018-01-01 RX ADMIN — METOPROLOL TARTRATE SCH MG: 25 TABLET, FILM COATED ORAL at 22:24

## 2018-01-01 RX ADMIN — SODIUM CHLORIDE PRN MG: 9 INJECTION, SOLUTION INTRAVENOUS at 16:28

## 2018-01-01 RX ADMIN — Medication SCH ML: at 00:00

## 2018-01-01 RX ADMIN — PANTOPRAZOLE SODIUM SCH MG: 40 INJECTION, POWDER, FOR SOLUTION INTRAVENOUS at 10:47

## 2018-01-01 RX ADMIN — PANTOPRAZOLE SODIUM SCH MG: 40 INJECTION, POWDER, FOR SOLUTION INTRAVENOUS at 22:23

## 2018-01-01 RX ADMIN — CEFTRIAXONE SODIUM SCH MLS/HR: 1 INJECTION, POWDER, FOR SOLUTION INTRAMUSCULAR; INTRAVENOUS at 08:38

## 2018-01-01 RX ADMIN — SODIUM CHLORIDE PRN MG: 9 INJECTION, SOLUTION INTRAVENOUS at 04:29

## 2018-01-01 RX ADMIN — SODIUM CHLORIDE PRN MG: 9 INJECTION, SOLUTION INTRAVENOUS at 08:39

## 2018-01-01 RX ADMIN — PROMETHAZINE HYDROCHLORIDE PRN MG: 25 INJECTION INTRAMUSCULAR; INTRAVENOUS at 20:38

## 2018-01-02 VITALS — SYSTOLIC BLOOD PRESSURE: 125 MMHG | DIASTOLIC BLOOD PRESSURE: 55 MMHG

## 2018-01-02 VITALS — SYSTOLIC BLOOD PRESSURE: 117 MMHG | DIASTOLIC BLOOD PRESSURE: 46 MMHG

## 2018-01-02 VITALS — DIASTOLIC BLOOD PRESSURE: 39 MMHG | SYSTOLIC BLOOD PRESSURE: 103 MMHG

## 2018-01-02 VITALS — SYSTOLIC BLOOD PRESSURE: 106 MMHG | DIASTOLIC BLOOD PRESSURE: 59 MMHG

## 2018-01-02 VITALS — SYSTOLIC BLOOD PRESSURE: 106 MMHG | DIASTOLIC BLOOD PRESSURE: 48 MMHG

## 2018-01-02 LAB
ANION GAP SERPL CALCULATED.3IONS-SCNC: 6 MMOL/L (ref 5–15)
ANION GAP SERPL CALCULATED.3IONS-SCNC: 8 MMOL/L (ref 5–15)
BUN SERPL-MCNC: 29 MG/DL (ref 7–18)
BUN SERPL-MCNC: 33 MG/DL (ref 7–18)
BUN/CREAT SERPL: 14.5
BUN/CREAT SERPL: 15.9
CALCIUM SERPL-MCNC: 7.1 MG/DL (ref 8.5–10.1)
CALCIUM SERPL-MCNC: 7.2 MG/DL (ref 8.5–10.1)
CHLORIDE SERPL-SCNC: 108 MMOL/L (ref 98–107)
CHLORIDE SERPL-SCNC: 109 MMOL/L (ref 98–107)
CO2 SERPL-SCNC: 22 MMOL/L (ref 21–32)
CO2 SERPL-SCNC: 23 MMOL/L (ref 21–32)
GLUCOSE SERPL-MCNC: 124 MG/DL (ref 74–106)
GLUCOSE SERPL-MCNC: 129 MG/DL (ref 74–106)
POTASSIUM SERPL-SCNC: 3.9 MMOL/L (ref 3.5–5.1)
POTASSIUM SERPL-SCNC: 4 MMOL/L (ref 3.5–5.1)
SODIUM SERPL-SCNC: 137 MMOL/L (ref 136–145)
SODIUM SERPL-SCNC: 139 MMOL/L (ref 136–145)

## 2018-01-02 RX ADMIN — METOPROLOL TARTRATE SCH MG: 25 TABLET, FILM COATED ORAL at 09:51

## 2018-01-02 RX ADMIN — SODIUM CHLORIDE SCH MLS/HR: 0.9 INJECTION, SOLUTION INTRAVENOUS at 18:30

## 2018-01-02 RX ADMIN — PROMETHAZINE HYDROCHLORIDE PRN MG: 25 INJECTION INTRAMUSCULAR; INTRAVENOUS at 02:16

## 2018-01-02 RX ADMIN — SODIUM CHLORIDE SCH MLS/HR: 0.9 INJECTION, SOLUTION INTRAVENOUS at 09:30

## 2018-01-02 RX ADMIN — SODIUM CHLORIDE PRN MG: 9 INJECTION, SOLUTION INTRAVENOUS at 10:35

## 2018-01-02 RX ADMIN — Medication SCH ML: at 16:18

## 2018-01-02 RX ADMIN — Medication SCH ML: at 08:47

## 2018-01-02 RX ADMIN — Medication SCH ML: at 12:52

## 2018-01-02 RX ADMIN — Medication SCH ML: at 04:00

## 2018-01-02 RX ADMIN — SODIUM CHLORIDE PRN MG: 9 INJECTION, SOLUTION INTRAVENOUS at 02:15

## 2018-01-02 RX ADMIN — Medication SCH ML: at 00:13

## 2018-01-02 RX ADMIN — Medication SCH ML: at 20:04

## 2018-01-02 RX ADMIN — SODIUM CHLORIDE SCH MLS/HR: 0.9 INJECTION, SOLUTION INTRAVENOUS at 01:30

## 2018-01-02 RX ADMIN — CEFTRIAXONE SODIUM SCH MLS/HR: 1 INJECTION, POWDER, FOR SOLUTION INTRAMUSCULAR; INTRAVENOUS at 08:47

## 2018-01-02 RX ADMIN — LOSARTAN POTASSIUM SCH MG: 25 TABLET, FILM COATED ORAL at 09:51

## 2018-01-02 RX ADMIN — METOPROLOL TARTRATE SCH MG: 25 TABLET, FILM COATED ORAL at 21:31

## 2018-01-02 RX ADMIN — PANTOPRAZOLE SODIUM SCH MG: 40 INJECTION, POWDER, FOR SOLUTION INTRAVENOUS at 09:50

## 2018-01-03 VITALS — SYSTOLIC BLOOD PRESSURE: 138 MMHG | DIASTOLIC BLOOD PRESSURE: 54 MMHG

## 2018-01-03 VITALS — SYSTOLIC BLOOD PRESSURE: 127 MMHG | DIASTOLIC BLOOD PRESSURE: 50 MMHG

## 2018-01-03 VITALS — DIASTOLIC BLOOD PRESSURE: 53 MMHG | SYSTOLIC BLOOD PRESSURE: 137 MMHG

## 2018-01-03 VITALS — DIASTOLIC BLOOD PRESSURE: 66 MMHG | SYSTOLIC BLOOD PRESSURE: 165 MMHG

## 2018-01-03 LAB
ANION GAP SERPL CALCULATED.3IONS-SCNC: 6 MMOL/L (ref 5–15)
BUN SERPL-MCNC: 30 MG/DL (ref 7–18)
BUN/CREAT SERPL: 18.6
CALCIUM SERPL-MCNC: 7.3 MG/DL (ref 8.5–10.1)
CHLORIDE SERPL-SCNC: 111 MMOL/L (ref 98–107)
CO2 SERPL-SCNC: 23 MMOL/L (ref 21–32)
GLUCOSE SERPL-MCNC: 129 MG/DL (ref 74–106)
POTASSIUM SERPL-SCNC: 4.1 MMOL/L (ref 3.5–5.1)
SODIUM SERPL-SCNC: 140 MMOL/L (ref 136–145)

## 2018-01-03 RX ADMIN — Medication SCH ML: at 00:07

## 2018-01-03 RX ADMIN — SODIUM CHLORIDE SCH MLS/HR: 0.9 INJECTION, SOLUTION INTRAVENOUS at 01:34

## 2018-01-03 RX ADMIN — Medication SCH ML: at 16:00

## 2018-01-03 RX ADMIN — Medication SCH ML: at 03:43

## 2018-01-03 RX ADMIN — METOPROLOL TARTRATE SCH MG: 25 TABLET, FILM COATED ORAL at 10:19

## 2018-01-03 RX ADMIN — SODIUM CHLORIDE SCH MLS/HR: 0.9 INJECTION, SOLUTION INTRAVENOUS at 09:03

## 2018-01-03 RX ADMIN — Medication SCH ML: at 08:58

## 2018-01-03 RX ADMIN — Medication SCH ML: at 12:53

## 2018-01-03 RX ADMIN — CEFTRIAXONE SODIUM SCH MLS/HR: 1 INJECTION, POWDER, FOR SOLUTION INTRAMUSCULAR; INTRAVENOUS at 08:58

## 2018-02-06 ENCOUNTER — HOSPITAL ENCOUNTER (EMERGENCY)
Dept: HOSPITAL 15 - ER | Age: 68
Discharge: LEFT BEFORE DISCHARGE | End: 2018-02-06
Payer: COMMERCIAL

## 2018-02-06 VITALS — DIASTOLIC BLOOD PRESSURE: 78 MMHG | SYSTOLIC BLOOD PRESSURE: 198 MMHG

## 2018-02-06 VITALS — BODY MASS INDEX: 36.96 KG/M2 | WEIGHT: 230 LBS | HEIGHT: 66 IN

## 2018-02-06 DIAGNOSIS — I25.2: ICD-10-CM

## 2018-02-06 DIAGNOSIS — Z87.11: ICD-10-CM

## 2018-02-06 DIAGNOSIS — M19.90: ICD-10-CM

## 2018-02-06 DIAGNOSIS — Z87.440: ICD-10-CM

## 2018-02-06 DIAGNOSIS — I11.0: ICD-10-CM

## 2018-02-06 DIAGNOSIS — K82.9: Primary | ICD-10-CM

## 2018-02-06 DIAGNOSIS — I50.9: ICD-10-CM

## 2018-02-06 DIAGNOSIS — Z90.710: ICD-10-CM

## 2018-02-06 LAB
ALBUMIN SERPL-MCNC: 2.5 G/DL (ref 3.4–5)
ALP SERPL-CCNC: 164 U/L (ref 45–117)
ALT SERPL-CCNC: 61 U/L (ref 13–56)
ANION GAP SERPL CALCULATED.3IONS-SCNC: 6 MMOL/L (ref 5–15)
BILIRUB SERPL-MCNC: 2.5 MG/DL (ref 0.2–1)
BUN SERPL-MCNC: 12 MG/DL (ref 7–18)
BUN/CREAT SERPL: 14
CALCIUM SERPL-MCNC: 8 MG/DL (ref 8.5–10.1)
CHLORIDE SERPL-SCNC: 106 MMOL/L (ref 98–107)
CO2 SERPL-SCNC: 27 MMOL/L (ref 21–32)
GLUCOSE SERPL-MCNC: 106 MG/DL (ref 74–106)
HCT VFR BLD AUTO: 34.1 % (ref 36–46)
HGB BLD-MCNC: 11.6 G/DL (ref 12.2–16.2)
MAGNESIUM SERPL-MCNC: 1.8 MG/DL (ref 1.6–2.6)
MCH RBC QN AUTO: 35.4 PG (ref 28–32)
MCV RBC AUTO: 104.4 FL (ref 80–100)
NRBC BLD QL AUTO: 0.4 %
POTASSIUM SERPL-SCNC: 3.4 MMOL/L (ref 3.5–5.1)
PROT SERPL-MCNC: 6.8 G/DL (ref 6.4–8.2)
SODIUM SERPL-SCNC: 139 MMOL/L (ref 136–145)

## 2018-02-06 PROCEDURE — 93005 ELECTROCARDIOGRAM TRACING: CPT

## 2018-02-06 PROCEDURE — 80053 COMPREHEN METABOLIC PANEL: CPT

## 2018-02-06 PROCEDURE — 76705 ECHO EXAM OF ABDOMEN: CPT

## 2018-02-06 PROCEDURE — 71045 X-RAY EXAM CHEST 1 VIEW: CPT

## 2018-02-06 PROCEDURE — 36415 COLL VENOUS BLD VENIPUNCTURE: CPT

## 2018-02-06 PROCEDURE — 83735 ASSAY OF MAGNESIUM: CPT

## 2018-02-06 PROCEDURE — 84484 ASSAY OF TROPONIN QUANT: CPT

## 2018-02-06 PROCEDURE — 85025 COMPLETE CBC W/AUTO DIFF WBC: CPT

## 2018-02-06 PROCEDURE — 83605 ASSAY OF LACTIC ACID: CPT

## 2018-02-06 PROCEDURE — 87040 BLOOD CULTURE FOR BACTERIA: CPT

## 2018-02-07 ENCOUNTER — HOSPITAL ENCOUNTER (INPATIENT)
Dept: HOSPITAL 15 - ER | Age: 68
LOS: 3 days | Discharge: HOME | DRG: 871 | End: 2018-02-10
Attending: INTERNAL MEDICINE | Admitting: INTERNAL MEDICINE
Payer: COMMERCIAL

## 2018-02-07 VITALS — DIASTOLIC BLOOD PRESSURE: 69 MMHG | SYSTOLIC BLOOD PRESSURE: 157 MMHG

## 2018-02-07 VITALS — HEIGHT: 55 IN | WEIGHT: 247.8 LBS | BODY MASS INDEX: 57.35 KG/M2

## 2018-02-07 VITALS — SYSTOLIC BLOOD PRESSURE: 157 MMHG | DIASTOLIC BLOOD PRESSURE: 69 MMHG

## 2018-02-07 VITALS — DIASTOLIC BLOOD PRESSURE: 82 MMHG | SYSTOLIC BLOOD PRESSURE: 153 MMHG

## 2018-02-07 DIAGNOSIS — Z82.3: ICD-10-CM

## 2018-02-07 DIAGNOSIS — A41.9: Primary | ICD-10-CM

## 2018-02-07 DIAGNOSIS — Z90.89: ICD-10-CM

## 2018-02-07 DIAGNOSIS — R74.8: ICD-10-CM

## 2018-02-07 DIAGNOSIS — K74.60: ICD-10-CM

## 2018-02-07 DIAGNOSIS — I13.0: ICD-10-CM

## 2018-02-07 DIAGNOSIS — B19.20: ICD-10-CM

## 2018-02-07 DIAGNOSIS — Z82.49: ICD-10-CM

## 2018-02-07 DIAGNOSIS — N18.2: ICD-10-CM

## 2018-02-07 DIAGNOSIS — Z86.718: ICD-10-CM

## 2018-02-07 DIAGNOSIS — F41.9: ICD-10-CM

## 2018-02-07 DIAGNOSIS — I50.43: ICD-10-CM

## 2018-02-07 DIAGNOSIS — J20.9: ICD-10-CM

## 2018-02-07 DIAGNOSIS — J44.0: ICD-10-CM

## 2018-02-07 DIAGNOSIS — I85.10: ICD-10-CM

## 2018-02-07 DIAGNOSIS — E87.6: ICD-10-CM

## 2018-02-07 DIAGNOSIS — G47.00: ICD-10-CM

## 2018-02-07 DIAGNOSIS — K44.9: ICD-10-CM

## 2018-02-07 DIAGNOSIS — R16.1: ICD-10-CM

## 2018-02-07 DIAGNOSIS — K57.30: ICD-10-CM

## 2018-02-07 DIAGNOSIS — D61.818: ICD-10-CM

## 2018-02-07 DIAGNOSIS — N20.0: ICD-10-CM

## 2018-02-07 DIAGNOSIS — F32.9: ICD-10-CM

## 2018-02-07 DIAGNOSIS — J10.1: ICD-10-CM

## 2018-02-07 DIAGNOSIS — J44.1: ICD-10-CM

## 2018-02-07 DIAGNOSIS — K59.00: ICD-10-CM

## 2018-02-07 DIAGNOSIS — Z87.11: ICD-10-CM

## 2018-02-07 DIAGNOSIS — Z90.710: ICD-10-CM

## 2018-02-07 DIAGNOSIS — Z83.3: ICD-10-CM

## 2018-02-07 DIAGNOSIS — E11.22: ICD-10-CM

## 2018-02-07 DIAGNOSIS — D63.8: ICD-10-CM

## 2018-02-07 DIAGNOSIS — E66.9: ICD-10-CM

## 2018-02-07 LAB
ALBUMIN SERPL-MCNC: 2.4 G/DL (ref 3.4–5)
ALP SERPL-CCNC: 140 U/L (ref 45–117)
ALT SERPL-CCNC: 51 U/L (ref 13–56)
AMYLASE SERPL-CCNC: 57 U/L (ref 25–115)
ANION GAP SERPL CALCULATED.3IONS-SCNC: 10 MMOL/L (ref 5–15)
APTT PPP: 35.4 SEC (ref 22.64–33.71)
BILIRUB SERPL-MCNC: 2.9 MG/DL (ref 0.2–1)
BUN SERPL-MCNC: 13 MG/DL (ref 7–18)
BUN/CREAT SERPL: 13
CALCIUM SERPL-MCNC: 7.7 MG/DL (ref 8.5–10.1)
CHLORIDE SERPL-SCNC: 105 MMOL/L (ref 98–107)
CO2 SERPL-SCNC: 24 MMOL/L (ref 21–32)
GLUCOSE SERPL-MCNC: 189 MG/DL (ref 74–106)
HCT VFR BLD AUTO: 32 % (ref 36–46)
HGB BLD-MCNC: 10.8 G/DL (ref 12.2–16.2)
INR PPP: 1.21 (ref 0.9–1.15)
LACTATE PLASV-SCNC: 2.2 MMOL/L (ref 0.4–2)
LIPASE SERPL-CCNC: 271 U/L (ref 73–393)
MAGNESIUM SERPL-MCNC: 1.6 MG/DL (ref 1.6–2.6)
MAGNESIUM SERPL-MCNC: 1.7 MG/DL (ref 1.6–2.6)
MCH RBC QN AUTO: 35.2 PG (ref 28–32)
MCV RBC AUTO: 104.1 FL (ref 80–100)
NRBC BLD QL AUTO: 0.4 %
POTASSIUM SERPL-SCNC: 3.1 MMOL/L (ref 3.5–5.1)
PROT SERPL-MCNC: 6.4 G/DL (ref 6.4–8.2)
PROTHROMBIN TIME: 13.2 SEC (ref 9.37–12.3)
SODIUM SERPL-SCNC: 139 MMOL/L (ref 136–145)

## 2018-02-07 PROCEDURE — 83735 ASSAY OF MAGNESIUM: CPT

## 2018-02-07 PROCEDURE — 86141 C-REACTIVE PROTEIN HS: CPT

## 2018-02-07 PROCEDURE — 85025 COMPLETE CBC W/AUTO DIFF WBC: CPT

## 2018-02-07 PROCEDURE — 93005 ELECTROCARDIOGRAM TRACING: CPT

## 2018-02-07 PROCEDURE — 83036 HEMOGLOBIN GLYCOSYLATED A1C: CPT

## 2018-02-07 PROCEDURE — 87040 BLOOD CULTURE FOR BACTERIA: CPT

## 2018-02-07 PROCEDURE — 82150 ASSAY OF AMYLASE: CPT

## 2018-02-07 PROCEDURE — 82962 GLUCOSE BLOOD TEST: CPT

## 2018-02-07 PROCEDURE — 87081 CULTURE SCREEN ONLY: CPT

## 2018-02-07 PROCEDURE — 94640 AIRWAY INHALATION TREATMENT: CPT

## 2018-02-07 PROCEDURE — 80061 LIPID PANEL: CPT

## 2018-02-07 PROCEDURE — 36600 WITHDRAWAL OF ARTERIAL BLOOD: CPT

## 2018-02-07 PROCEDURE — 36415 COLL VENOUS BLD VENIPUNCTURE: CPT

## 2018-02-07 PROCEDURE — 94761 N-INVAS EAR/PLS OXIMETRY MLT: CPT

## 2018-02-07 PROCEDURE — 80053 COMPREHEN METABOLIC PANEL: CPT

## 2018-02-07 PROCEDURE — 87077 CULTURE AEROBIC IDENTIFY: CPT

## 2018-02-07 PROCEDURE — 85652 RBC SED RATE AUTOMATED: CPT

## 2018-02-07 PROCEDURE — 83690 ASSAY OF LIPASE: CPT

## 2018-02-07 PROCEDURE — 87186 SC STD MICRODIL/AGAR DIL: CPT

## 2018-02-07 PROCEDURE — 71045 X-RAY EXAM CHEST 1 VIEW: CPT

## 2018-02-07 PROCEDURE — 84484 ASSAY OF TROPONIN QUANT: CPT

## 2018-02-07 PROCEDURE — 93306 TTE W/DOPPLER COMPLETE: CPT

## 2018-02-07 PROCEDURE — 85730 THROMBOPLASTIN TIME PARTIAL: CPT

## 2018-02-07 PROCEDURE — 84443 ASSAY THYROID STIM HORMONE: CPT

## 2018-02-07 PROCEDURE — 85610 PROTHROMBIN TIME: CPT

## 2018-02-07 PROCEDURE — 82805 BLOOD GASES W/O2 SATURATION: CPT

## 2018-02-07 PROCEDURE — 83605 ASSAY OF LACTIC ACID: CPT

## 2018-02-07 PROCEDURE — 83880 ASSAY OF NATRIURETIC PEPTIDE: CPT

## 2018-02-07 PROCEDURE — 74176 CT ABD & PELVIS W/O CONTRAST: CPT

## 2018-02-07 PROCEDURE — 82550 ASSAY OF CK (CPK): CPT

## 2018-02-07 PROCEDURE — 87400 INFLUENZA A/B EACH AG IA: CPT

## 2018-02-07 PROCEDURE — 96374 THER/PROPH/DIAG INJ IV PUSH: CPT

## 2018-02-07 PROCEDURE — 96375 TX/PRO/DX INJ NEW DRUG ADDON: CPT

## 2018-02-07 RX ADMIN — HUMAN INSULIN SCH UNITS: 100 INJECTION, SOLUTION SUBCUTANEOUS at 17:00

## 2018-02-07 RX ADMIN — IPRATROPIUM BROMIDE SCH MG: 0.5 SOLUTION RESPIRATORY (INHALATION) at 19:07

## 2018-02-07 RX ADMIN — Medication SCH STRIP: at 17:27

## 2018-02-07 RX ADMIN — Medication SCH STRIP: at 22:06

## 2018-02-07 RX ADMIN — OSELTAMIVIR PHOSPHATE SCH MG: 75 CAPSULE ORAL at 22:05

## 2018-02-07 RX ADMIN — HUMAN INSULIN SCH UNITS: 100 INJECTION, SOLUTION SUBCUTANEOUS at 22:06

## 2018-02-07 RX ADMIN — CARVEDILOL SCH MG: 3.12 TABLET, FILM COATED ORAL at 22:04

## 2018-02-07 RX ADMIN — PROPRANOLOL HYDROCHLORIDE SCH MG: 20 TABLET ORAL at 22:05

## 2018-02-07 RX ADMIN — ALBUTEROL SULFATE SCH MG: 2.5 SOLUTION RESPIRATORY (INHALATION) at 19:07

## 2018-02-07 RX ADMIN — MORPHINE SULFATE PRN MG: 4 INJECTION, SOLUTION INTRAMUSCULAR; INTRAVENOUS at 21:03

## 2018-02-07 RX ADMIN — SODIUM CHLORIDE SCH ML: 9 INJECTION INTRAMUSCULAR; INTRAVENOUS; SUBCUTANEOUS at 22:03

## 2018-02-08 VITALS — DIASTOLIC BLOOD PRESSURE: 68 MMHG | SYSTOLIC BLOOD PRESSURE: 148 MMHG

## 2018-02-08 VITALS — DIASTOLIC BLOOD PRESSURE: 53 MMHG | SYSTOLIC BLOOD PRESSURE: 140 MMHG

## 2018-02-08 VITALS — SYSTOLIC BLOOD PRESSURE: 157 MMHG | DIASTOLIC BLOOD PRESSURE: 69 MMHG

## 2018-02-08 VITALS — SYSTOLIC BLOOD PRESSURE: 118 MMHG | DIASTOLIC BLOOD PRESSURE: 59 MMHG

## 2018-02-08 VITALS — DIASTOLIC BLOOD PRESSURE: 51 MMHG | SYSTOLIC BLOOD PRESSURE: 117 MMHG

## 2018-02-08 VITALS — SYSTOLIC BLOOD PRESSURE: 124 MMHG | DIASTOLIC BLOOD PRESSURE: 50 MMHG

## 2018-02-08 LAB
ALBUMIN SERPL-MCNC: 2.2 G/DL (ref 3.4–5)
ALP SERPL-CCNC: 134 U/L (ref 45–117)
ALT SERPL-CCNC: 50 U/L (ref 13–56)
ANION GAP SERPL CALCULATED.3IONS-SCNC: 6 MMOL/L (ref 5–15)
BILIRUB SERPL-MCNC: 1.6 MG/DL (ref 0.2–1)
BUN SERPL-MCNC: 18 MG/DL (ref 7–18)
BUN/CREAT SERPL: 16.4
CALCIUM SERPL-MCNC: 7.4 MG/DL (ref 8.5–10.1)
CHLORIDE SERPL-SCNC: 107 MMOL/L (ref 98–107)
CHOLEST SERPL-MCNC: 106 MG/DL (ref ?–200)
CO2 SERPL-SCNC: 26 MMOL/L (ref 21–32)
GLUCOSE SERPL-MCNC: 284 MG/DL (ref 74–106)
HCT VFR BLD AUTO: 30.9 % (ref 36–46)
HDLC SERPL-MCNC: 19 MG/DL (ref 40–59)
HGB BLD-MCNC: 10.7 G/DL (ref 12.2–16.2)
MCH RBC QN AUTO: 35.9 PG (ref 28–32)
MCV RBC AUTO: 104.3 FL (ref 80–100)
NRBC BLD QL AUTO: 0.6 %
POTASSIUM SERPL-SCNC: 4 MMOL/L (ref 3.5–5.1)
PROT SERPL-MCNC: 6.2 G/DL (ref 6.4–8.2)
SODIUM SERPL-SCNC: 139 MMOL/L (ref 136–145)
TRIGL SERPL-MCNC: 87 MG/DL (ref ?–150)

## 2018-02-08 RX ADMIN — PROMETHAZINE HYDROCHLORIDE AND DEXTROMETHOPHAN HYDROBROMIDE PRN ML: 6.25; 15 SOLUTION ORAL at 20:38

## 2018-02-08 RX ADMIN — ALBUTEROL SULFATE SCH MG: 2.5 SOLUTION RESPIRATORY (INHALATION) at 18:25

## 2018-02-08 RX ADMIN — HUMAN INSULIN SCH UNITS: 100 INJECTION, SOLUTION SUBCUTANEOUS at 22:00

## 2018-02-08 RX ADMIN — ALBUTEROL SULFATE SCH MG: 2.5 SOLUTION RESPIRATORY (INHALATION) at 00:45

## 2018-02-08 RX ADMIN — ENALAPRIL MALEATE SCH MG: 2.5 TABLET ORAL at 09:00

## 2018-02-08 RX ADMIN — IPRATROPIUM BROMIDE SCH MG: 0.5 SOLUTION RESPIRATORY (INHALATION) at 18:25

## 2018-02-08 RX ADMIN — CARVEDILOL SCH MG: 3.12 TABLET, FILM COATED ORAL at 09:00

## 2018-02-08 RX ADMIN — POTASSIUM CHLORIDE SCH MEQ: 1500 TABLET, EXTENDED RELEASE ORAL at 08:58

## 2018-02-08 RX ADMIN — HUMAN INSULIN SCH UNITS: 100 INJECTION, SOLUTION SUBCUTANEOUS at 07:08

## 2018-02-08 RX ADMIN — IPRATROPIUM BROMIDE SCH MG: 0.5 SOLUTION RESPIRATORY (INHALATION) at 00:45

## 2018-02-08 RX ADMIN — FUROSEMIDE SCH MG: 10 INJECTION, SOLUTION INTRAMUSCULAR; INTRAVENOUS at 08:59

## 2018-02-08 RX ADMIN — CARVEDILOL SCH MG: 3.12 TABLET, FILM COATED ORAL at 22:16

## 2018-02-08 RX ADMIN — PROPRANOLOL HYDROCHLORIDE SCH MG: 20 TABLET ORAL at 08:59

## 2018-02-08 RX ADMIN — Medication SCH STRIP: at 07:08

## 2018-02-08 RX ADMIN — Medication SCH STRIP: at 22:16

## 2018-02-08 RX ADMIN — MORPHINE SULFATE PRN MG: 4 INJECTION, SOLUTION INTRAMUSCULAR; INTRAVENOUS at 14:13

## 2018-02-08 RX ADMIN — SODIUM CHLORIDE SCH ML: 9 INJECTION INTRAMUSCULAR; INTRAVENOUS; SUBCUTANEOUS at 16:53

## 2018-02-08 RX ADMIN — NITROGLYCERIN SCH PATCH: 0.2 PATCH TRANSDERMAL at 10:00

## 2018-02-08 RX ADMIN — IPRATROPIUM BROMIDE SCH MG: 0.5 SOLUTION RESPIRATORY (INHALATION) at 12:15

## 2018-02-08 RX ADMIN — ALBUTEROL SULFATE SCH MG: 2.5 SOLUTION RESPIRATORY (INHALATION) at 12:15

## 2018-02-08 RX ADMIN — HUMAN INSULIN SCH UNITS: 100 INJECTION, SOLUTION SUBCUTANEOUS at 17:00

## 2018-02-08 RX ADMIN — Medication SCH STRIP: at 18:24

## 2018-02-08 RX ADMIN — Medication SCH STRIP: at 12:28

## 2018-02-08 RX ADMIN — MORPHINE SULFATE PRN MG: 4 INJECTION, SOLUTION INTRAMUSCULAR; INTRAVENOUS at 04:19

## 2018-02-08 RX ADMIN — PANTOPRAZOLE SODIUM SCH MG: 40 TABLET, DELAYED RELEASE ORAL at 22:14

## 2018-02-08 RX ADMIN — SODIUM CHLORIDE SCH ML: 9 INJECTION INTRAMUSCULAR; INTRAVENOUS; SUBCUTANEOUS at 22:16

## 2018-02-08 RX ADMIN — RIVAROXABAN SCH MG: 20 TABLET, FILM COATED ORAL at 08:58

## 2018-02-08 RX ADMIN — HUMAN INSULIN SCH UNITS: 100 INJECTION, SOLUTION SUBCUTANEOUS at 11:30

## 2018-02-08 RX ADMIN — OSELTAMIVIR PHOSPHATE SCH MG: 75 CAPSULE ORAL at 09:00

## 2018-02-08 RX ADMIN — PROPRANOLOL HYDROCHLORIDE SCH MG: 20 TABLET ORAL at 22:14

## 2018-02-08 RX ADMIN — CEFTRIAXONE SODIUM SCH MLS/HR: 1 INJECTION, POWDER, FOR SOLUTION INTRAMUSCULAR; INTRAVENOUS at 09:01

## 2018-02-08 RX ADMIN — SODIUM CHLORIDE SCH ML: 9 INJECTION INTRAMUSCULAR; INTRAVENOUS; SUBCUTANEOUS at 05:38

## 2018-02-08 RX ADMIN — ALBUTEROL SULFATE SCH MG: 2.5 SOLUTION RESPIRATORY (INHALATION) at 06:03

## 2018-02-08 RX ADMIN — IPRATROPIUM BROMIDE SCH MG: 0.5 SOLUTION RESPIRATORY (INHALATION) at 06:03

## 2018-02-08 RX ADMIN — OSELTAMIVIR PHOSPHATE SCH MG: 75 CAPSULE ORAL at 22:14

## 2018-02-09 VITALS — SYSTOLIC BLOOD PRESSURE: 99 MMHG | DIASTOLIC BLOOD PRESSURE: 37 MMHG

## 2018-02-09 VITALS — SYSTOLIC BLOOD PRESSURE: 121 MMHG | DIASTOLIC BLOOD PRESSURE: 65 MMHG

## 2018-02-09 VITALS — SYSTOLIC BLOOD PRESSURE: 129 MMHG | DIASTOLIC BLOOD PRESSURE: 89 MMHG

## 2018-02-09 VITALS — DIASTOLIC BLOOD PRESSURE: 57 MMHG | SYSTOLIC BLOOD PRESSURE: 130 MMHG

## 2018-02-09 VITALS — DIASTOLIC BLOOD PRESSURE: 66 MMHG | SYSTOLIC BLOOD PRESSURE: 141 MMHG

## 2018-02-09 RX ADMIN — IPRATROPIUM BROMIDE SCH MG: 0.5 SOLUTION RESPIRATORY (INHALATION) at 13:22

## 2018-02-09 RX ADMIN — Medication SCH STRIP: at 11:50

## 2018-02-09 RX ADMIN — Medication SCH STRIP: at 06:26

## 2018-02-09 RX ADMIN — OSELTAMIVIR PHOSPHATE SCH MG: 75 CAPSULE ORAL at 10:45

## 2018-02-09 RX ADMIN — PANTOPRAZOLE SODIUM SCH MG: 40 TABLET, DELAYED RELEASE ORAL at 22:12

## 2018-02-09 RX ADMIN — IPRATROPIUM BROMIDE SCH MG: 0.5 SOLUTION RESPIRATORY (INHALATION) at 06:06

## 2018-02-09 RX ADMIN — ALBUTEROL SULFATE SCH MG: 2.5 SOLUTION RESPIRATORY (INHALATION) at 13:22

## 2018-02-09 RX ADMIN — IPRATROPIUM BROMIDE SCH MG: 0.5 SOLUTION RESPIRATORY (INHALATION) at 20:04

## 2018-02-09 RX ADMIN — PROMETHAZINE HYDROCHLORIDE AND DEXTROMETHOPHAN HYDROBROMIDE PRN ML: 6.25; 15 SOLUTION ORAL at 22:23

## 2018-02-09 RX ADMIN — Medication SCH STRIP: at 22:12

## 2018-02-09 RX ADMIN — SODIUM CHLORIDE SCH ML: 9 INJECTION INTRAMUSCULAR; INTRAVENOUS; SUBCUTANEOUS at 06:26

## 2018-02-09 RX ADMIN — HUMAN INSULIN SCH UNITS: 100 INJECTION, SOLUTION SUBCUTANEOUS at 22:00

## 2018-02-09 RX ADMIN — ALBUTEROL SULFATE SCH MG: 2.5 SOLUTION RESPIRATORY (INHALATION) at 00:00

## 2018-02-09 RX ADMIN — PROPRANOLOL HYDROCHLORIDE SCH MG: 20 TABLET ORAL at 10:28

## 2018-02-09 RX ADMIN — HYDROCODONE BITARTRATE AND ACETAMINOPHEN PRN TAB: 5; 325 TABLET ORAL at 10:45

## 2018-02-09 RX ADMIN — FUROSEMIDE SCH MG: 10 INJECTION, SOLUTION INTRAMUSCULAR; INTRAVENOUS at 10:27

## 2018-02-09 RX ADMIN — PANTOPRAZOLE SODIUM SCH MG: 40 TABLET, DELAYED RELEASE ORAL at 10:27

## 2018-02-09 RX ADMIN — CARVEDILOL SCH MG: 3.12 TABLET, FILM COATED ORAL at 22:00

## 2018-02-09 RX ADMIN — CEFTRIAXONE SODIUM SCH MLS/HR: 1 INJECTION, POWDER, FOR SOLUTION INTRAMUSCULAR; INTRAVENOUS at 10:27

## 2018-02-09 RX ADMIN — ALBUTEROL SULFATE SCH MG: 2.5 SOLUTION RESPIRATORY (INHALATION) at 06:06

## 2018-02-09 RX ADMIN — ENALAPRIL MALEATE SCH MG: 2.5 TABLET ORAL at 10:30

## 2018-02-09 RX ADMIN — PROMETHAZINE HYDROCHLORIDE AND DEXTROMETHOPHAN HYDROBROMIDE PRN ML: 6.25; 15 SOLUTION ORAL at 02:11

## 2018-02-09 RX ADMIN — HUMAN INSULIN SCH UNITS: 100 INJECTION, SOLUTION SUBCUTANEOUS at 17:00

## 2018-02-09 RX ADMIN — SODIUM CHLORIDE SCH ML: 9 INJECTION INTRAMUSCULAR; INTRAVENOUS; SUBCUTANEOUS at 14:14

## 2018-02-09 RX ADMIN — RIVAROXABAN SCH MG: 20 TABLET, FILM COATED ORAL at 10:29

## 2018-02-09 RX ADMIN — PROPRANOLOL HYDROCHLORIDE SCH MG: 20 TABLET ORAL at 22:00

## 2018-02-09 RX ADMIN — POTASSIUM CHLORIDE SCH MEQ: 1500 TABLET, EXTENDED RELEASE ORAL at 10:27

## 2018-02-09 RX ADMIN — MORPHINE SULFATE PRN MG: 4 INJECTION, SOLUTION INTRAMUSCULAR; INTRAVENOUS at 02:16

## 2018-02-09 RX ADMIN — SODIUM CHLORIDE SCH ML: 9 INJECTION INTRAMUSCULAR; INTRAVENOUS; SUBCUTANEOUS at 22:10

## 2018-02-09 RX ADMIN — Medication SCH STRIP: at 17:04

## 2018-02-09 RX ADMIN — HUMAN INSULIN SCH UNITS: 100 INJECTION, SOLUTION SUBCUTANEOUS at 12:10

## 2018-02-09 RX ADMIN — CARVEDILOL SCH MG: 3.12 TABLET, FILM COATED ORAL at 10:31

## 2018-02-09 RX ADMIN — HUMAN INSULIN SCH UNITS: 100 INJECTION, SOLUTION SUBCUTANEOUS at 06:26

## 2018-02-09 RX ADMIN — NITROGLYCERIN SCH PATCH: 0.2 PATCH TRANSDERMAL at 10:29

## 2018-02-09 RX ADMIN — IPRATROPIUM BROMIDE SCH MG: 0.5 SOLUTION RESPIRATORY (INHALATION) at 00:00

## 2018-02-09 RX ADMIN — ALBUTEROL SULFATE SCH MG: 2.5 SOLUTION RESPIRATORY (INHALATION) at 20:04

## 2018-02-09 RX ADMIN — OSELTAMIVIR PHOSPHATE SCH MG: 75 CAPSULE ORAL at 22:12

## 2018-02-10 VITALS — DIASTOLIC BLOOD PRESSURE: 59 MMHG | SYSTOLIC BLOOD PRESSURE: 132 MMHG

## 2018-02-10 VITALS — SYSTOLIC BLOOD PRESSURE: 132 MMHG | DIASTOLIC BLOOD PRESSURE: 56 MMHG

## 2018-02-10 VITALS — SYSTOLIC BLOOD PRESSURE: 133 MMHG | DIASTOLIC BLOOD PRESSURE: 68 MMHG

## 2018-02-10 VITALS — DIASTOLIC BLOOD PRESSURE: 68 MMHG | SYSTOLIC BLOOD PRESSURE: 133 MMHG

## 2018-02-10 VITALS — SYSTOLIC BLOOD PRESSURE: 136 MMHG | DIASTOLIC BLOOD PRESSURE: 71 MMHG

## 2018-02-10 RX ADMIN — RIVAROXABAN SCH MG: 20 TABLET, FILM COATED ORAL at 09:49

## 2018-02-10 RX ADMIN — Medication SCH STRIP: at 06:34

## 2018-02-10 RX ADMIN — POTASSIUM CHLORIDE SCH MEQ: 1500 TABLET, EXTENDED RELEASE ORAL at 09:50

## 2018-02-10 RX ADMIN — FUROSEMIDE SCH MG: 10 INJECTION, SOLUTION INTRAMUSCULAR; INTRAVENOUS at 09:49

## 2018-02-10 RX ADMIN — PANTOPRAZOLE SODIUM SCH MG: 40 TABLET, DELAYED RELEASE ORAL at 09:49

## 2018-02-10 RX ADMIN — SODIUM CHLORIDE SCH ML: 9 INJECTION INTRAMUSCULAR; INTRAVENOUS; SUBCUTANEOUS at 13:52

## 2018-02-10 RX ADMIN — OSELTAMIVIR PHOSPHATE SCH MG: 75 CAPSULE ORAL at 09:49

## 2018-02-10 RX ADMIN — IPRATROPIUM BROMIDE SCH MG: 0.5 SOLUTION RESPIRATORY (INHALATION) at 07:20

## 2018-02-10 RX ADMIN — NITROGLYCERIN SCH PATCH: 0.2 PATCH TRANSDERMAL at 09:52

## 2018-02-10 RX ADMIN — ALBUTEROL SULFATE SCH MG: 2.5 SOLUTION RESPIRATORY (INHALATION) at 00:45

## 2018-02-10 RX ADMIN — HUMAN INSULIN SCH UNITS: 100 INJECTION, SOLUTION SUBCUTANEOUS at 06:34

## 2018-02-10 RX ADMIN — HUMAN INSULIN SCH UNITS: 100 INJECTION, SOLUTION SUBCUTANEOUS at 11:30

## 2018-02-10 RX ADMIN — Medication SCH STRIP: at 11:35

## 2018-02-10 RX ADMIN — HYDROCODONE BITARTRATE AND ACETAMINOPHEN PRN TAB: 5; 325 TABLET ORAL at 11:34

## 2018-02-10 RX ADMIN — ALBUTEROL SULFATE SCH MG: 2.5 SOLUTION RESPIRATORY (INHALATION) at 07:20

## 2018-02-10 RX ADMIN — PROPRANOLOL HYDROCHLORIDE SCH MG: 20 TABLET ORAL at 09:51

## 2018-02-10 RX ADMIN — ALBUTEROL SULFATE SCH MG: 2.5 SOLUTION RESPIRATORY (INHALATION) at 12:30

## 2018-02-10 RX ADMIN — HYDROCODONE BITARTRATE AND ACETAMINOPHEN PRN TAB: 5; 325 TABLET ORAL at 15:38

## 2018-02-10 RX ADMIN — CARVEDILOL SCH MG: 3.12 TABLET, FILM COATED ORAL at 09:50

## 2018-02-10 RX ADMIN — IPRATROPIUM BROMIDE SCH MG: 0.5 SOLUTION RESPIRATORY (INHALATION) at 00:45

## 2018-02-10 RX ADMIN — ENALAPRIL MALEATE SCH MG: 2.5 TABLET ORAL at 09:50

## 2018-02-10 RX ADMIN — CEFTRIAXONE SODIUM SCH MLS/HR: 1 INJECTION, POWDER, FOR SOLUTION INTRAMUSCULAR; INTRAVENOUS at 09:34

## 2018-02-10 RX ADMIN — SODIUM CHLORIDE SCH ML: 9 INJECTION INTRAMUSCULAR; INTRAVENOUS; SUBCUTANEOUS at 06:21

## 2018-02-10 RX ADMIN — IPRATROPIUM BROMIDE SCH MG: 0.5 SOLUTION RESPIRATORY (INHALATION) at 12:30

## 2018-02-16 ENCOUNTER — HOSPITAL ENCOUNTER (EMERGENCY)
Dept: HOSPITAL 15 - ER | Age: 68
Discharge: HOME | End: 2018-02-16
Payer: COMMERCIAL

## 2018-02-16 VITALS — BODY MASS INDEX: 34.53 KG/M2 | WEIGHT: 220 LBS | HEIGHT: 67 IN

## 2018-02-16 VITALS — SYSTOLIC BLOOD PRESSURE: 158 MMHG | DIASTOLIC BLOOD PRESSURE: 65 MMHG

## 2018-02-16 DIAGNOSIS — E43: ICD-10-CM

## 2018-02-16 DIAGNOSIS — N39.0: Primary | ICD-10-CM

## 2018-02-16 DIAGNOSIS — K76.9: ICD-10-CM

## 2018-02-16 DIAGNOSIS — I50.9: ICD-10-CM

## 2018-02-16 DIAGNOSIS — J44.9: ICD-10-CM

## 2018-02-16 DIAGNOSIS — Z87.11: ICD-10-CM

## 2018-02-16 DIAGNOSIS — I11.0: ICD-10-CM

## 2018-02-16 DIAGNOSIS — Z90.710: ICD-10-CM

## 2018-02-16 LAB
ALBUMIN SERPL-MCNC: 2.3 G/DL (ref 3.4–5)
ALCOHOL, URINE: < 3 MG/DL (ref 0–5)
ALP SERPL-CCNC: 144 U/L (ref 45–117)
ALT SERPL-CCNC: 48 U/L (ref 13–56)
AMPHETAMINES UR QL SCN: NEGATIVE
ANION GAP SERPL CALCULATED.3IONS-SCNC: 5 MMOL/L (ref 5–15)
BARBITURATES UR QL SCN: NEGATIVE
BENZODIAZ UR QL SCN: NEGATIVE
BILIRUB SERPL-MCNC: 2.1 MG/DL (ref 0.2–1)
BUN SERPL-MCNC: 11 MG/DL (ref 7–18)
BUN/CREAT SERPL: 12.6
BZE UR QL SCN: NEGATIVE
CALCIUM SERPL-MCNC: 8.4 MG/DL (ref 8.5–10.1)
CANNABINOIDS UR QL SCN: POSITIVE
CHLORIDE SERPL-SCNC: 104 MMOL/L (ref 98–107)
CO2 SERPL-SCNC: 31 MMOL/L (ref 21–32)
GLUCOSE SERPL-MCNC: 120 MG/DL (ref 74–106)
HCT VFR BLD AUTO: 33.2 % (ref 36–46)
HGB BLD-MCNC: 11 G/DL (ref 12.2–16.2)
MAGNESIUM SERPL-MCNC: 2 MG/DL (ref 1.6–2.6)
MCH RBC QN AUTO: 35.1 PG (ref 28–32)
MCV RBC AUTO: 106 FL (ref 80–100)
NRBC BLD QL AUTO: 0 %
OPIATES UR QL SCN: POSITIVE
PCP UR QL SCN: NEGATIVE
POTASSIUM SERPL-SCNC: 4.6 MMOL/L (ref 3.5–5.1)
PROT SERPL-MCNC: 6.4 G/DL (ref 6.4–8.2)
SODIUM SERPL-SCNC: 140 MMOL/L (ref 136–145)

## 2018-02-16 PROCEDURE — 87086 URINE CULTURE/COLONY COUNT: CPT

## 2018-02-16 PROCEDURE — 71045 X-RAY EXAM CHEST 1 VIEW: CPT

## 2018-02-16 PROCEDURE — 87040 BLOOD CULTURE FOR BACTERIA: CPT

## 2018-02-16 PROCEDURE — 36415 COLL VENOUS BLD VENIPUNCTURE: CPT

## 2018-02-16 PROCEDURE — 83735 ASSAY OF MAGNESIUM: CPT

## 2018-02-16 PROCEDURE — 96375 TX/PRO/DX INJ NEW DRUG ADDON: CPT

## 2018-02-16 PROCEDURE — 93005 ELECTROCARDIOGRAM TRACING: CPT

## 2018-02-16 PROCEDURE — 80053 COMPREHEN METABOLIC PANEL: CPT

## 2018-02-16 PROCEDURE — 80307 DRUG TEST PRSMV CHEM ANLYZR: CPT

## 2018-02-16 PROCEDURE — 85025 COMPLETE CBC W/AUTO DIFF WBC: CPT

## 2018-02-16 PROCEDURE — 51702 INSERT TEMP BLADDER CATH: CPT

## 2018-02-16 PROCEDURE — 83605 ASSAY OF LACTIC ACID: CPT

## 2018-02-16 PROCEDURE — 99285 EMERGENCY DEPT VISIT HI MDM: CPT

## 2018-02-16 PROCEDURE — 96374 THER/PROPH/DIAG INJ IV PUSH: CPT

## 2018-02-16 PROCEDURE — 83880 ASSAY OF NATRIURETIC PEPTIDE: CPT

## 2018-02-16 PROCEDURE — 81001 URINALYSIS AUTO W/SCOPE: CPT

## 2018-02-16 PROCEDURE — 84484 ASSAY OF TROPONIN QUANT: CPT

## 2018-06-22 ENCOUNTER — HOSPITAL ENCOUNTER (INPATIENT)
Dept: HOSPITAL 15 - ER | Age: 68
LOS: 3 days | Discharge: HOME HEALTH | DRG: 441 | End: 2018-06-25
Attending: HOSPITALIST | Admitting: INTERNAL MEDICINE
Payer: COMMERCIAL

## 2018-06-22 VITALS — DIASTOLIC BLOOD PRESSURE: 70 MMHG | SYSTOLIC BLOOD PRESSURE: 160 MMHG

## 2018-06-22 VITALS — DIASTOLIC BLOOD PRESSURE: 69 MMHG | SYSTOLIC BLOOD PRESSURE: 162 MMHG

## 2018-06-22 VITALS — HEIGHT: 60 IN | WEIGHT: 233.69 LBS | BODY MASS INDEX: 45.88 KG/M2

## 2018-06-22 DIAGNOSIS — Z83.3: ICD-10-CM

## 2018-06-22 DIAGNOSIS — F41.9: ICD-10-CM

## 2018-06-22 DIAGNOSIS — E66.01: ICD-10-CM

## 2018-06-22 DIAGNOSIS — I13.0: ICD-10-CM

## 2018-06-22 DIAGNOSIS — F32.9: ICD-10-CM

## 2018-06-22 DIAGNOSIS — K76.6: ICD-10-CM

## 2018-06-22 DIAGNOSIS — N18.2: ICD-10-CM

## 2018-06-22 DIAGNOSIS — Z87.11: ICD-10-CM

## 2018-06-22 DIAGNOSIS — J44.9: ICD-10-CM

## 2018-06-22 DIAGNOSIS — K44.9: ICD-10-CM

## 2018-06-22 DIAGNOSIS — B19.20: ICD-10-CM

## 2018-06-22 DIAGNOSIS — K72.90: Primary | ICD-10-CM

## 2018-06-22 DIAGNOSIS — F17.210: ICD-10-CM

## 2018-06-22 DIAGNOSIS — Z82.3: ICD-10-CM

## 2018-06-22 DIAGNOSIS — K74.60: ICD-10-CM

## 2018-06-22 DIAGNOSIS — B95.62: ICD-10-CM

## 2018-06-22 DIAGNOSIS — Z82.49: ICD-10-CM

## 2018-06-22 DIAGNOSIS — I50.43: ICD-10-CM

## 2018-06-22 DIAGNOSIS — K57.90: ICD-10-CM

## 2018-06-22 DIAGNOSIS — Z87.440: ICD-10-CM

## 2018-06-22 DIAGNOSIS — Z79.01: ICD-10-CM

## 2018-06-22 DIAGNOSIS — Z90.710: ICD-10-CM

## 2018-06-22 LAB
ALBUMIN SERPL-MCNC: 2.3 G/DL (ref 3.4–5)
ALP SERPL-CCNC: 152 U/L (ref 45–117)
ALT SERPL-CCNC: 34 U/L (ref 13–56)
AMYLASE SERPL-CCNC: 48 U/L (ref 25–115)
ANION GAP SERPL CALCULATED.3IONS-SCNC: 7 MMOL/L (ref 5–15)
APTT PPP: 30.8 SEC (ref 23.78–33.04)
BILIRUB SERPL-MCNC: 2.8 MG/DL (ref 0.2–1)
BUN SERPL-MCNC: 9 MG/DL (ref 7–18)
BUN/CREAT SERPL: 9.9
CALCIUM SERPL-MCNC: 8.3 MG/DL (ref 8.5–10.1)
CHLORIDE SERPL-SCNC: 113 MMOL/L (ref 98–107)
CO2 SERPL-SCNC: 23 MMOL/L (ref 21–32)
GLUCOSE SERPL-MCNC: 147 MG/DL (ref 74–106)
HCT VFR BLD AUTO: 40.4 % (ref 36–46)
HGB BLD-MCNC: 13.5 G/DL (ref 12.2–16.2)
INR PPP: 1.21 (ref 0.9–1.15)
LIPASE SERPL-CCNC: 147 U/L (ref 73–393)
MCH RBC QN AUTO: 34.8 PG (ref 28–32)
MCV RBC AUTO: 104 FL (ref 80–100)
NRBC BLD QL AUTO: 0.2 %
POTASSIUM SERPL-SCNC: 3.7 MMOL/L (ref 3.5–5.1)
PROT SERPL-MCNC: 6.6 G/DL (ref 6.4–8.2)
PROTHROMBIN TIME: 12.8 SEC (ref 9.27–12.13)
SODIUM SERPL-SCNC: 143 MMOL/L (ref 136–145)

## 2018-06-22 PROCEDURE — 84484 ASSAY OF TROPONIN QUANT: CPT

## 2018-06-22 PROCEDURE — 83735 ASSAY OF MAGNESIUM: CPT

## 2018-06-22 PROCEDURE — 83880 ASSAY OF NATRIURETIC PEPTIDE: CPT

## 2018-06-22 PROCEDURE — 36415 COLL VENOUS BLD VENIPUNCTURE: CPT

## 2018-06-22 PROCEDURE — 81001 URINALYSIS AUTO W/SCOPE: CPT

## 2018-06-22 PROCEDURE — 85025 COMPLETE CBC W/AUTO DIFF WBC: CPT

## 2018-06-22 PROCEDURE — 85007 BL SMEAR W/DIFF WBC COUNT: CPT

## 2018-06-22 PROCEDURE — 71045 X-RAY EXAM CHEST 1 VIEW: CPT

## 2018-06-22 PROCEDURE — 93005 ELECTROCARDIOGRAM TRACING: CPT

## 2018-06-22 PROCEDURE — 80061 LIPID PANEL: CPT

## 2018-06-22 PROCEDURE — 83690 ASSAY OF LIPASE: CPT

## 2018-06-22 PROCEDURE — 84443 ASSAY THYROID STIM HORMONE: CPT

## 2018-06-22 PROCEDURE — 51702 INSERT TEMP BLADDER CATH: CPT

## 2018-06-22 PROCEDURE — 80048 BASIC METABOLIC PNL TOTAL CA: CPT

## 2018-06-22 PROCEDURE — 74176 CT ABD & PELVIS W/O CONTRAST: CPT

## 2018-06-22 PROCEDURE — 82550 ASSAY OF CK (CPK): CPT

## 2018-06-22 PROCEDURE — 80053 COMPREHEN METABOLIC PANEL: CPT

## 2018-06-22 PROCEDURE — 82140 ASSAY OF AMMONIA: CPT

## 2018-06-22 PROCEDURE — 85027 COMPLETE CBC AUTOMATED: CPT

## 2018-06-22 PROCEDURE — 85610 PROTHROMBIN TIME: CPT

## 2018-06-22 PROCEDURE — 96374 THER/PROPH/DIAG INJ IV PUSH: CPT

## 2018-06-22 PROCEDURE — 94761 N-INVAS EAR/PLS OXIMETRY MLT: CPT

## 2018-06-22 PROCEDURE — 70450 CT HEAD/BRAIN W/O DYE: CPT

## 2018-06-22 PROCEDURE — 85730 THROMBOPLASTIN TIME PARTIAL: CPT

## 2018-06-22 PROCEDURE — 93970 EXTREMITY STUDY: CPT

## 2018-06-22 PROCEDURE — 82150 ASSAY OF AMYLASE: CPT

## 2018-06-22 PROCEDURE — 94640 AIRWAY INHALATION TREATMENT: CPT

## 2018-06-22 PROCEDURE — 96361 HYDRATE IV INFUSION ADD-ON: CPT

## 2018-06-22 PROCEDURE — 87081 CULTURE SCREEN ONLY: CPT

## 2018-06-22 RX ADMIN — ENALAPRIL MALEATE SCH MG: 10 TABLET ORAL at 19:26

## 2018-06-22 RX ADMIN — FUROSEMIDE SCH MG: 10 INJECTION, SOLUTION INTRAMUSCULAR; INTRAVENOUS at 19:07

## 2018-06-22 RX ADMIN — PROPRANOLOL HYDROCHLORIDE SCH MG: 20 TABLET ORAL at 22:00

## 2018-06-22 RX ADMIN — Medication SCH ML: at 19:07

## 2018-06-22 RX ADMIN — Medication SCH ML: at 22:45

## 2018-06-23 VITALS — DIASTOLIC BLOOD PRESSURE: 59 MMHG | SYSTOLIC BLOOD PRESSURE: 170 MMHG

## 2018-06-23 VITALS — DIASTOLIC BLOOD PRESSURE: 53 MMHG | SYSTOLIC BLOOD PRESSURE: 149 MMHG

## 2018-06-23 VITALS — DIASTOLIC BLOOD PRESSURE: 61 MMHG | SYSTOLIC BLOOD PRESSURE: 137 MMHG

## 2018-06-23 VITALS — SYSTOLIC BLOOD PRESSURE: 165 MMHG | DIASTOLIC BLOOD PRESSURE: 82 MMHG

## 2018-06-23 VITALS — DIASTOLIC BLOOD PRESSURE: 82 MMHG | SYSTOLIC BLOOD PRESSURE: 165 MMHG

## 2018-06-23 VITALS — DIASTOLIC BLOOD PRESSURE: 75 MMHG | SYSTOLIC BLOOD PRESSURE: 141 MMHG

## 2018-06-23 LAB
ALBUMIN SERPL-MCNC: 2.6 G/DL (ref 3.4–5)
ALP SERPL-CCNC: 168 U/L (ref 45–117)
ALT SERPL-CCNC: 39 U/L (ref 13–56)
ANION GAP SERPL CALCULATED.3IONS-SCNC: 8 MMOL/L (ref 5–15)
BILIRUB SERPL-MCNC: 3.4 MG/DL (ref 0.2–1)
BUN SERPL-MCNC: 10 MG/DL (ref 7–18)
BUN/CREAT SERPL: 10.5
CALCIUM SERPL-MCNC: 8.6 MG/DL (ref 8.5–10.1)
CHLORIDE SERPL-SCNC: 111 MMOL/L (ref 98–107)
CHOLEST SERPL-MCNC: 173 MG/DL (ref ?–200)
CO2 SERPL-SCNC: 26 MMOL/L (ref 21–32)
GLUCOSE SERPL-MCNC: 111 MG/DL (ref 74–106)
HCT VFR BLD AUTO: 41.4 % (ref 36–46)
HDLC SERPL-MCNC: 51 MG/DL (ref 40–59)
HGB BLD-MCNC: 14.3 G/DL (ref 12.2–16.2)
MCH RBC QN AUTO: 35.5 PG (ref 28–32)
MCV RBC AUTO: 103.2 FL (ref 80–100)
NRBC BLD QL AUTO: 0.2 %
POTASSIUM SERPL-SCNC: 3.1 MMOL/L (ref 3.5–5.1)
PROT SERPL-MCNC: 7.4 G/DL (ref 6.4–8.2)
SODIUM SERPL-SCNC: 145 MMOL/L (ref 136–145)
TRIGL SERPL-MCNC: 102 MG/DL (ref ?–150)

## 2018-06-23 RX ADMIN — POTASSIUM CHLORIDE SCH MEQ: 750 TABLET, FILM COATED, EXTENDED RELEASE ORAL at 14:24

## 2018-06-23 RX ADMIN — Medication SCH ML: at 23:06

## 2018-06-23 RX ADMIN — Medication SCH ML: at 18:12

## 2018-06-23 RX ADMIN — Medication SCH ML: at 14:24

## 2018-06-23 RX ADMIN — ENALAPRIL MALEATE SCH MG: 10 TABLET ORAL at 11:08

## 2018-06-23 RX ADMIN — Medication SCH ML: at 02:45

## 2018-06-23 RX ADMIN — Medication SCH ML: at 06:35

## 2018-06-23 RX ADMIN — FUROSEMIDE SCH MG: 10 INJECTION, SOLUTION INTRAMUSCULAR; INTRAVENOUS at 06:34

## 2018-06-23 RX ADMIN — PROPRANOLOL HYDROCHLORIDE SCH MG: 20 TABLET ORAL at 23:05

## 2018-06-23 RX ADMIN — FUROSEMIDE SCH MG: 10 INJECTION, SOLUTION INTRAMUSCULAR; INTRAVENOUS at 18:13

## 2018-06-23 RX ADMIN — PROPRANOLOL HYDROCHLORIDE SCH MG: 20 TABLET ORAL at 11:05

## 2018-06-23 RX ADMIN — POTASSIUM CHLORIDE SCH MEQ: 750 TABLET, FILM COATED, EXTENDED RELEASE ORAL at 23:06

## 2018-06-23 RX ADMIN — Medication SCH ML: at 11:08

## 2018-06-23 RX ADMIN — FAMOTIDINE SCH MG: 20 TABLET, FILM COATED ORAL at 23:05

## 2018-06-24 VITALS — SYSTOLIC BLOOD PRESSURE: 134 MMHG | DIASTOLIC BLOOD PRESSURE: 56 MMHG

## 2018-06-24 VITALS — DIASTOLIC BLOOD PRESSURE: 61 MMHG | SYSTOLIC BLOOD PRESSURE: 130 MMHG

## 2018-06-24 VITALS — DIASTOLIC BLOOD PRESSURE: 52 MMHG | SYSTOLIC BLOOD PRESSURE: 139 MMHG

## 2018-06-24 VITALS — SYSTOLIC BLOOD PRESSURE: 140 MMHG | DIASTOLIC BLOOD PRESSURE: 57 MMHG

## 2018-06-24 VITALS — SYSTOLIC BLOOD PRESSURE: 141 MMHG | DIASTOLIC BLOOD PRESSURE: 53 MMHG

## 2018-06-24 LAB
HCT VFR BLD AUTO: 45.3 % (ref 36–46)
HGB BLD-MCNC: 15.6 G/DL (ref 12.2–16.2)
MCH RBC QN AUTO: 35.7 PG (ref 28–32)
MCV RBC AUTO: 103.9 FL (ref 80–100)
NRBC BLD QL AUTO: 0.1 %

## 2018-06-24 RX ADMIN — Medication SCH ML: at 18:07

## 2018-06-24 RX ADMIN — FAMOTIDINE SCH MG: 20 TABLET, FILM COATED ORAL at 09:34

## 2018-06-24 RX ADMIN — Medication SCH ML: at 10:36

## 2018-06-24 RX ADMIN — POTASSIUM CHLORIDE SCH MEQ: 750 TABLET, FILM COATED, EXTENDED RELEASE ORAL at 06:35

## 2018-06-24 RX ADMIN — POTASSIUM CHLORIDE SCH MEQ: 750 TABLET, FILM COATED, EXTENDED RELEASE ORAL at 22:43

## 2018-06-24 RX ADMIN — FUROSEMIDE SCH MG: 10 INJECTION, SOLUTION INTRAMUSCULAR; INTRAVENOUS at 18:08

## 2018-06-24 RX ADMIN — FAMOTIDINE SCH MG: 20 TABLET, FILM COATED ORAL at 22:43

## 2018-06-24 RX ADMIN — PROPRANOLOL HYDROCHLORIDE SCH MG: 20 TABLET ORAL at 22:43

## 2018-06-24 RX ADMIN — Medication SCH ML: at 03:35

## 2018-06-24 RX ADMIN — PROPRANOLOL HYDROCHLORIDE SCH MG: 20 TABLET ORAL at 09:35

## 2018-06-24 RX ADMIN — FUROSEMIDE SCH MG: 10 INJECTION, SOLUTION INTRAMUSCULAR; INTRAVENOUS at 06:35

## 2018-06-24 RX ADMIN — Medication SCH ML: at 06:37

## 2018-06-24 RX ADMIN — POTASSIUM CHLORIDE SCH MEQ: 750 TABLET, FILM COATED, EXTENDED RELEASE ORAL at 14:00

## 2018-06-24 RX ADMIN — ENALAPRIL MALEATE SCH MG: 10 TABLET ORAL at 09:34

## 2018-06-25 VITALS — SYSTOLIC BLOOD PRESSURE: 133 MMHG | DIASTOLIC BLOOD PRESSURE: 54 MMHG

## 2018-06-25 VITALS — SYSTOLIC BLOOD PRESSURE: 126 MMHG | DIASTOLIC BLOOD PRESSURE: 54 MMHG

## 2018-06-25 VITALS — DIASTOLIC BLOOD PRESSURE: 57 MMHG | SYSTOLIC BLOOD PRESSURE: 143 MMHG

## 2018-06-25 LAB
ANION GAP SERPL CALCULATED.3IONS-SCNC: 7 MMOL/L (ref 5–15)
BUN SERPL-MCNC: 13 MG/DL (ref 7–18)
BUN/CREAT SERPL: 12.9
CALCIUM SERPL-MCNC: 8.6 MG/DL (ref 8.5–10.1)
CHLORIDE SERPL-SCNC: 107 MMOL/L (ref 98–107)
CO2 SERPL-SCNC: 24 MMOL/L (ref 21–32)
GLUCOSE SERPL-MCNC: 136 MG/DL (ref 74–106)
HCT VFR BLD AUTO: 39.5 % (ref 36–46)
HGB BLD-MCNC: 13.6 G/DL (ref 12.2–16.2)
MAGNESIUM SERPL-MCNC: 2.3 MG/DL (ref 1.6–2.6)
MCH RBC QN AUTO: 35.4 PG (ref 28–32)
MCV RBC AUTO: 103.1 FL (ref 80–100)
POTASSIUM SERPL-SCNC: 4.1 MMOL/L (ref 3.5–5.1)
SODIUM SERPL-SCNC: 138 MMOL/L (ref 136–145)

## 2018-06-25 RX ADMIN — Medication SCH ML: at 10:11

## 2018-06-25 RX ADMIN — POTASSIUM CHLORIDE SCH MEQ: 750 TABLET, FILM COATED, EXTENDED RELEASE ORAL at 05:33

## 2018-06-25 RX ADMIN — ENALAPRIL MALEATE SCH MG: 10 TABLET ORAL at 10:10

## 2018-06-25 RX ADMIN — PROPRANOLOL HYDROCHLORIDE SCH MG: 20 TABLET ORAL at 10:11

## 2018-06-25 RX ADMIN — FUROSEMIDE SCH MG: 10 INJECTION, SOLUTION INTRAMUSCULAR; INTRAVENOUS at 05:33

## 2018-06-25 RX ADMIN — FAMOTIDINE SCH MG: 20 TABLET, FILM COATED ORAL at 10:10

## 2018-06-25 RX ADMIN — POTASSIUM CHLORIDE SCH MEQ: 750 TABLET, FILM COATED, EXTENDED RELEASE ORAL at 14:15

## 2018-06-25 RX ADMIN — Medication SCH ML: at 05:32

## 2019-10-22 ENCOUNTER — HOSPITAL ENCOUNTER (INPATIENT)
Dept: HOSPITAL 15 - ER | Age: 69
LOS: 4 days | Discharge: HOME | DRG: 291 | End: 2019-10-26
Attending: FAMILY MEDICINE | Admitting: NURSE PRACTITIONER
Payer: COMMERCIAL

## 2019-10-22 VITALS — WEIGHT: 231.71 LBS | HEIGHT: 66 IN | BODY MASS INDEX: 37.24 KG/M2

## 2019-10-22 DIAGNOSIS — J44.1: ICD-10-CM

## 2019-10-22 DIAGNOSIS — F12.90: ICD-10-CM

## 2019-10-22 DIAGNOSIS — I50.43: ICD-10-CM

## 2019-10-22 DIAGNOSIS — E11.22: ICD-10-CM

## 2019-10-22 DIAGNOSIS — E87.6: ICD-10-CM

## 2019-10-22 DIAGNOSIS — Z79.899: ICD-10-CM

## 2019-10-22 DIAGNOSIS — E66.01: ICD-10-CM

## 2019-10-22 DIAGNOSIS — M19.90: ICD-10-CM

## 2019-10-22 DIAGNOSIS — F17.210: ICD-10-CM

## 2019-10-22 DIAGNOSIS — F32.9: ICD-10-CM

## 2019-10-22 DIAGNOSIS — R07.89: ICD-10-CM

## 2019-10-22 DIAGNOSIS — Z90.710: ICD-10-CM

## 2019-10-22 DIAGNOSIS — Z87.11: ICD-10-CM

## 2019-10-22 DIAGNOSIS — Z91.19: ICD-10-CM

## 2019-10-22 DIAGNOSIS — Z83.3: ICD-10-CM

## 2019-10-22 DIAGNOSIS — I13.0: Primary | ICD-10-CM

## 2019-10-22 DIAGNOSIS — E72.20: ICD-10-CM

## 2019-10-22 DIAGNOSIS — M54.5: ICD-10-CM

## 2019-10-22 DIAGNOSIS — D69.6: ICD-10-CM

## 2019-10-22 DIAGNOSIS — G40.909: ICD-10-CM

## 2019-10-22 DIAGNOSIS — G92: ICD-10-CM

## 2019-10-22 DIAGNOSIS — Z82.49: ICD-10-CM

## 2019-10-22 DIAGNOSIS — F41.9: ICD-10-CM

## 2019-10-22 DIAGNOSIS — N39.0: ICD-10-CM

## 2019-10-22 DIAGNOSIS — F15.10: ICD-10-CM

## 2019-10-22 DIAGNOSIS — Z79.82: ICD-10-CM

## 2019-10-22 DIAGNOSIS — G45.9: ICD-10-CM

## 2019-10-22 DIAGNOSIS — Z82.3: ICD-10-CM

## 2019-10-22 DIAGNOSIS — G89.29: ICD-10-CM

## 2019-10-22 DIAGNOSIS — K57.90: ICD-10-CM

## 2019-10-22 DIAGNOSIS — I25.2: ICD-10-CM

## 2019-10-22 DIAGNOSIS — F10.10: ICD-10-CM

## 2019-10-22 DIAGNOSIS — Z98.1: ICD-10-CM

## 2019-10-22 DIAGNOSIS — N18.9: ICD-10-CM

## 2019-10-22 DIAGNOSIS — Z86.718: ICD-10-CM

## 2019-10-22 LAB
ALBUMIN SERPL-MCNC: 2.5 G/DL (ref 3.4–5)
ALCOHOL, URINE: < 3 MG/DL (ref 0–5)
ALP SERPL-CCNC: 169 U/L (ref 45–117)
ALT SERPL-CCNC: 50 U/L (ref 13–56)
AMPHETAMINES UR QL SCN: POSITIVE
ANION GAP SERPL CALCULATED.3IONS-SCNC: 5 MMOL/L (ref 5–15)
APTT PPP: 31.9 SEC (ref 23.64–32.05)
BARBITURATES UR QL SCN: NEGATIVE
BENZODIAZ UR QL SCN: NEGATIVE
BILIRUB SERPL-MCNC: 2.1 MG/DL (ref 0.2–1)
BUN SERPL-MCNC: 16 MG/DL (ref 7–18)
BUN/CREAT SERPL: 16.3
BZE UR QL SCN: NEGATIVE
CALCIUM SERPL-MCNC: 8.2 MG/DL (ref 8.5–10.1)
CANNABINOIDS UR QL SCN: NEGATIVE
CHLORIDE SERPL-SCNC: 109 MMOL/L (ref 98–107)
CO2 SERPL-SCNC: 31 MMOL/L (ref 21–32)
GLUCOSE SERPL-MCNC: 97 MG/DL (ref 74–106)
HCT VFR BLD AUTO: 38.9 % (ref 36–46)
HGB BLD-MCNC: 13 G/DL (ref 12.2–16.2)
INR PPP: 1.32 (ref 0.9–1.15)
MCH RBC QN AUTO: 33.1 PG (ref 28–32)
MCV RBC AUTO: 99 FL (ref 80–100)
NRBC BLD QL AUTO: 0.1 %
OPIATES UR QL SCN: NEGATIVE
PCP UR QL SCN: NEGATIVE
POTASSIUM SERPL-SCNC: 3.9 MMOL/L (ref 3.5–5.1)
PROT SERPL-MCNC: 6.2 G/DL (ref 6.4–8.2)
SODIUM SERPL-SCNC: 145 MMOL/L (ref 136–145)

## 2019-10-22 PROCEDURE — 71045 X-RAY EXAM CHEST 1 VIEW: CPT

## 2019-10-22 PROCEDURE — 97116 GAIT TRAINING THERAPY: CPT

## 2019-10-22 PROCEDURE — 80053 COMPREHEN METABOLIC PANEL: CPT

## 2019-10-22 PROCEDURE — 97530 THERAPEUTIC ACTIVITIES: CPT

## 2019-10-22 PROCEDURE — 99291 CRITICAL CARE FIRST HOUR: CPT

## 2019-10-22 PROCEDURE — 85730 THROMBOPLASTIN TIME PARTIAL: CPT

## 2019-10-22 PROCEDURE — 85025 COMPLETE CBC W/AUTO DIFF WBC: CPT

## 2019-10-22 PROCEDURE — 93017 CV STRESS TEST TRACING ONLY: CPT

## 2019-10-22 PROCEDURE — 85610 PROTHROMBIN TIME: CPT

## 2019-10-22 PROCEDURE — 71275 CT ANGIOGRAPHY CHEST: CPT

## 2019-10-22 PROCEDURE — 81001 URINALYSIS AUTO W/SCOPE: CPT

## 2019-10-22 PROCEDURE — 80307 DRUG TEST PRSMV CHEM ANLYZR: CPT

## 2019-10-22 PROCEDURE — 36415 COLL VENOUS BLD VENIPUNCTURE: CPT

## 2019-10-22 PROCEDURE — 93886 INTRACRANIAL COMPLETE STUDY: CPT

## 2019-10-22 PROCEDURE — 93971 EXTREMITY STUDY: CPT

## 2019-10-22 PROCEDURE — 84484 ASSAY OF TROPONIN QUANT: CPT

## 2019-10-22 PROCEDURE — 80061 LIPID PANEL: CPT

## 2019-10-22 PROCEDURE — 96361 HYDRATE IV INFUSION ADD-ON: CPT

## 2019-10-22 PROCEDURE — 70551 MRI BRAIN STEM W/O DYE: CPT

## 2019-10-22 PROCEDURE — 82140 ASSAY OF AMMONIA: CPT

## 2019-10-22 PROCEDURE — 85379 FIBRIN DEGRADATION QUANT: CPT

## 2019-10-22 PROCEDURE — 97163 PT EVAL HIGH COMPLEX 45 MIN: CPT

## 2019-10-22 PROCEDURE — 70450 CT HEAD/BRAIN W/O DYE: CPT

## 2019-10-22 PROCEDURE — 78452 HT MUSCLE IMAGE SPECT MULT: CPT

## 2019-10-22 PROCEDURE — 96365 THER/PROPH/DIAG IV INF INIT: CPT

## 2019-10-22 PROCEDURE — 83880 ASSAY OF NATRIURETIC PEPTIDE: CPT

## 2019-10-22 PROCEDURE — 96375 TX/PRO/DX INJ NEW DRUG ADDON: CPT

## 2019-10-22 PROCEDURE — 93005 ELECTROCARDIOGRAM TRACING: CPT

## 2019-10-22 RX ADMIN — PROPRANOLOL HYDROCHLORIDE SCH MG: 20 TABLET ORAL at 22:00

## 2019-10-22 RX ADMIN — Medication SCH ML: at 22:00

## 2019-10-22 RX ADMIN — TEMAZEPAM PRN MG: 15 CAPSULE ORAL at 23:59

## 2019-10-22 RX ADMIN — FAMOTIDINE SCH MG: 20 TABLET, FILM COATED ORAL at 23:59

## 2019-10-23 VITALS — SYSTOLIC BLOOD PRESSURE: 162 MMHG | DIASTOLIC BLOOD PRESSURE: 67 MMHG

## 2019-10-23 VITALS — SYSTOLIC BLOOD PRESSURE: 127 MMHG | DIASTOLIC BLOOD PRESSURE: 60 MMHG

## 2019-10-23 VITALS — SYSTOLIC BLOOD PRESSURE: 125 MMHG | DIASTOLIC BLOOD PRESSURE: 68 MMHG

## 2019-10-23 VITALS — DIASTOLIC BLOOD PRESSURE: 63 MMHG | SYSTOLIC BLOOD PRESSURE: 127 MMHG

## 2019-10-23 VITALS — SYSTOLIC BLOOD PRESSURE: 127 MMHG | DIASTOLIC BLOOD PRESSURE: 63 MMHG

## 2019-10-23 VITALS — DIASTOLIC BLOOD PRESSURE: 64 MMHG | SYSTOLIC BLOOD PRESSURE: 144 MMHG

## 2019-10-23 VITALS — SYSTOLIC BLOOD PRESSURE: 158 MMHG | DIASTOLIC BLOOD PRESSURE: 57 MMHG

## 2019-10-23 LAB
ALBUMIN SERPL-MCNC: 2.1 G/DL (ref 3.4–5)
ALP SERPL-CCNC: 150 U/L (ref 45–117)
ALT SERPL-CCNC: 43 U/L (ref 13–56)
ANION GAP SERPL CALCULATED.3IONS-SCNC: 2 MMOL/L (ref 5–15)
BILIRUB SERPL-MCNC: 2 MG/DL (ref 0.2–1)
BUN SERPL-MCNC: 14 MG/DL (ref 7–18)
BUN/CREAT SERPL: 14.6
CALCIUM SERPL-MCNC: 7.6 MG/DL (ref 8.5–10.1)
CHLORIDE SERPL-SCNC: 111 MMOL/L (ref 98–107)
CHOLEST SERPL-MCNC: 112 MG/DL (ref ?–200)
CO2 SERPL-SCNC: 29 MMOL/L (ref 21–32)
GLUCOSE SERPL-MCNC: 139 MG/DL (ref 74–106)
HCT VFR BLD AUTO: 36 % (ref 36–46)
HDLC SERPL-MCNC: 31 MG/DL (ref 40–59)
HGB BLD-MCNC: 12.2 G/DL (ref 12.2–16.2)
MCH RBC QN AUTO: 33.3 PG (ref 28–32)
MCV RBC AUTO: 98.2 FL (ref 80–100)
NRBC BLD QL AUTO: 0.1 %
POTASSIUM SERPL-SCNC: 3.9 MMOL/L (ref 3.5–5.1)
PROT SERPL-MCNC: 5.4 G/DL (ref 6.4–8.2)
SODIUM SERPL-SCNC: 142 MMOL/L (ref 136–145)
TRIGL SERPL-MCNC: 53 MG/DL (ref ?–150)

## 2019-10-23 RX ADMIN — FAMOTIDINE SCH MG: 20 TABLET, FILM COATED ORAL at 21:45

## 2019-10-23 RX ADMIN — PROPRANOLOL HYDROCHLORIDE SCH MG: 20 TABLET ORAL at 09:53

## 2019-10-23 RX ADMIN — TEMAZEPAM PRN MG: 15 CAPSULE ORAL at 21:45

## 2019-10-23 RX ADMIN — Medication SCH ML: at 09:54

## 2019-10-23 RX ADMIN — FAMOTIDINE SCH MG: 20 TABLET, FILM COATED ORAL at 09:54

## 2019-10-23 RX ADMIN — PROPRANOLOL HYDROCHLORIDE SCH MG: 20 TABLET ORAL at 21:46

## 2019-10-23 RX ADMIN — Medication SCH ML: at 22:00

## 2019-10-23 NOTE — NUR
Respiratory note:

PT SEEN AND ASSESSED FOR PRN MED NEB TX AT 0011. TX NOT INDICATED AT THIS TIME. PT 
DISPLAYING NO SIGNS OF DISTRESS AT THIS TIME. HR 88 RR 16 POX 95% ON ROOM AIR.

## 2019-10-23 NOTE — NUR
PATIENT COMPLAINING TORRES LEAKING. 

DEFLATED BALLOON, ADVANCED BALLOON WITH 10 CC PLACED. 

PATIENT EDUCATED ON THE PROCESS AND TO NOTIFY IF THERE IS ANY FURTHER LEAKING.

## 2019-10-23 NOTE — NUR
Respiratory note: ASSESSED PT FOR PRN MED NEB AT THIS TIME, PT SLEEPING AT THIS TIME, NO 
RESP DISTRESS NOTED, NO TX INDICATED, PULSE OX 97% ON RA, HR 72, RR 20, BILATERAL BS 
DECREASED.

## 2019-10-23 NOTE — NUR
PATIENT HAS A PASS WORD OF "GRIFFIN" AND WOULD ONLY LIKE INFORMATION TO BE GIVEN TO HER 
BROTHER ADRIAN AS NOTED IN THE NOK INTERVENTION.

## 2019-10-23 NOTE — NUR
Respiratory note:

PT IS AWAKE, AND ALERT.  NO RESPIRATORY DISTRESS NOTED.  SPO2 95% ON RA, HR 67, RR 18, BS 
CLEAR T/O.  PRN MEDNEB TX NOT INDICATED.  PT INFORMED TO PUSH CALL BUTTON IF INCREASED WOB, 
SOB, OR WHEEZING OCCURS.

## 2019-10-24 VITALS — SYSTOLIC BLOOD PRESSURE: 155 MMHG | DIASTOLIC BLOOD PRESSURE: 75 MMHG

## 2019-10-24 VITALS — SYSTOLIC BLOOD PRESSURE: 153 MMHG | DIASTOLIC BLOOD PRESSURE: 76 MMHG

## 2019-10-24 VITALS — SYSTOLIC BLOOD PRESSURE: 159 MMHG | DIASTOLIC BLOOD PRESSURE: 73 MMHG

## 2019-10-24 VITALS — DIASTOLIC BLOOD PRESSURE: 69 MMHG | SYSTOLIC BLOOD PRESSURE: 164 MMHG

## 2019-10-24 VITALS — SYSTOLIC BLOOD PRESSURE: 156 MMHG | DIASTOLIC BLOOD PRESSURE: 60 MMHG

## 2019-10-24 RX ADMIN — PROPRANOLOL HYDROCHLORIDE SCH MG: 20 TABLET ORAL at 21:35

## 2019-10-24 RX ADMIN — Medication SCH ML: at 21:29

## 2019-10-24 RX ADMIN — FUROSEMIDE SCH MG: 20 TABLET ORAL at 11:09

## 2019-10-24 RX ADMIN — FAMOTIDINE SCH MG: 20 TABLET, FILM COATED ORAL at 11:07

## 2019-10-24 RX ADMIN — POTASSIUM CHLORIDE SCH MEQ: 1500 TABLET, EXTENDED RELEASE ORAL at 11:08

## 2019-10-24 RX ADMIN — TEMAZEPAM PRN MG: 15 CAPSULE ORAL at 21:30

## 2019-10-24 RX ADMIN — FAMOTIDINE SCH MG: 20 TABLET, FILM COATED ORAL at 21:30

## 2019-10-24 RX ADMIN — ONDANSETRON HYDROCHLORIDE PRN MG: 2 INJECTION, SOLUTION INTRAMUSCULAR; INTRAVENOUS at 21:29

## 2019-10-24 RX ADMIN — PROPRANOLOL HYDROCHLORIDE SCH MG: 20 TABLET ORAL at 11:09

## 2019-10-24 RX ADMIN — Medication SCH ML: at 11:09

## 2019-10-24 RX ADMIN — ONDANSETRON HYDROCHLORIDE PRN MG: 2 INJECTION, SOLUTION INTRAMUSCULAR; INTRAVENOUS at 12:06

## 2019-10-24 NOTE — NUR
PRN MED NEB ASSESSMENT. PT DENIES SOB NO DISTRESS NOTED AT TIS TIME. POX 96% ON RA HR 62 RR 
16 BS ARE CLEAR AND DIMINISHED. TX NOT GIVEN.

## 2019-10-24 NOTE — NUR
PT

Patient was being brought down for a stress test during afternoon PT visit.

-------------------------------------------------------------------------------

Addendum: 10/24/19 at 1359 by ASHLEY SUMMERS PTT

-------------------------------------------------------------------------------

Amended: Links added.

## 2019-10-24 NOTE — NUR
Opening Note 

Assumed care of patient, she is A & O x4, fatigued, patient wants to sleep. POC discussed 
with patient, she agrees to plan of treatment. No s/s of distress at this time. Oneil is 
patent and draining. Patient is comfortable at this time. No c/o pain. Bed is in lowest, 
locked position, call light within reach. Patient turns herself independently. Will continue 
to monitor Q1h and PRN.

## 2019-10-24 NOTE — NUR
PT

1st AM visit, patient was asleep and woke up but requested to comeback before lunch.

2nd AM visit, patient unarousable during visit. RYAN Benson was aware of outcome of the visit.

-------------------------------------------------------------------------------

Addendum: 10/24/19 at 1109 by ASHLEY SUMMERS PTT

-------------------------------------------------------------------------------

Amended: Links added.

## 2019-10-24 NOTE — NUR
Care endorsed to Faye DAVIS. 

Patient is comfortable at this time. Bed is in lowest, locked position, call light within 
reach.

## 2019-10-25 VITALS — SYSTOLIC BLOOD PRESSURE: 156 MMHG | DIASTOLIC BLOOD PRESSURE: 59 MMHG

## 2019-10-25 VITALS — SYSTOLIC BLOOD PRESSURE: 155 MMHG | DIASTOLIC BLOOD PRESSURE: 62 MMHG

## 2019-10-25 VITALS — DIASTOLIC BLOOD PRESSURE: 59 MMHG | SYSTOLIC BLOOD PRESSURE: 156 MMHG

## 2019-10-25 VITALS — DIASTOLIC BLOOD PRESSURE: 51 MMHG | SYSTOLIC BLOOD PRESSURE: 154 MMHG

## 2019-10-25 VITALS — SYSTOLIC BLOOD PRESSURE: 141 MMHG | DIASTOLIC BLOOD PRESSURE: 45 MMHG

## 2019-10-25 VITALS — DIASTOLIC BLOOD PRESSURE: 61 MMHG | SYSTOLIC BLOOD PRESSURE: 132 MMHG

## 2019-10-25 LAB
ALBUMIN SERPL-MCNC: 2.4 G/DL (ref 3.4–5)
ALP SERPL-CCNC: 170 U/L (ref 45–117)
ALT SERPL-CCNC: 47 U/L (ref 13–56)
ANION GAP SERPL CALCULATED.3IONS-SCNC: 5 MMOL/L (ref 5–15)
BILIRUB SERPL-MCNC: 2.6 MG/DL (ref 0.2–1)
BUN SERPL-MCNC: 12 MG/DL (ref 7–18)
BUN/CREAT SERPL: 12
CALCIUM SERPL-MCNC: 7.7 MG/DL (ref 8.5–10.1)
CHLORIDE SERPL-SCNC: 108 MMOL/L (ref 98–107)
CO2 SERPL-SCNC: 29 MMOL/L (ref 21–32)
GLUCOSE SERPL-MCNC: 97 MG/DL (ref 74–106)
HCT VFR BLD AUTO: 41.2 % (ref 36–46)
HGB BLD-MCNC: 13.8 G/DL (ref 12.2–16.2)
MCH RBC QN AUTO: 32.8 PG (ref 28–32)
MCV RBC AUTO: 98.3 FL (ref 80–100)
NRBC BLD QL AUTO: 0.1 %
POTASSIUM SERPL-SCNC: 3.7 MMOL/L (ref 3.5–5.1)
PROT SERPL-MCNC: 6.3 G/DL (ref 6.4–8.2)
SODIUM SERPL-SCNC: 142 MMOL/L (ref 136–145)

## 2019-10-25 RX ADMIN — PROPRANOLOL HYDROCHLORIDE SCH MG: 20 TABLET ORAL at 10:52

## 2019-10-25 RX ADMIN — PROPRANOLOL HYDROCHLORIDE SCH MG: 20 TABLET ORAL at 20:54

## 2019-10-25 RX ADMIN — FAMOTIDINE SCH MG: 20 TABLET, FILM COATED ORAL at 10:53

## 2019-10-25 RX ADMIN — POTASSIUM CHLORIDE SCH MEQ: 1500 TABLET, EXTENDED RELEASE ORAL at 10:47

## 2019-10-25 RX ADMIN — Medication SCH ML: at 20:53

## 2019-10-25 RX ADMIN — TEMAZEPAM PRN MG: 15 CAPSULE ORAL at 20:54

## 2019-10-25 RX ADMIN — FUROSEMIDE SCH MG: 20 TABLET ORAL at 10:47

## 2019-10-25 RX ADMIN — ONDANSETRON HYDROCHLORIDE PRN MG: 2 INJECTION, SOLUTION INTRAMUSCULAR; INTRAVENOUS at 00:47

## 2019-10-25 RX ADMIN — Medication SCH ML: at 10:53

## 2019-10-25 RX ADMIN — FAMOTIDINE SCH MG: 20 TABLET, FILM COATED ORAL at 20:53

## 2019-10-25 RX ADMIN — ONDANSETRON HYDROCHLORIDE PRN MG: 2 INJECTION, SOLUTION INTRAMUSCULAR; INTRAVENOUS at 20:53

## 2019-10-25 NOTE — NUR
Dr. Parisi was in to visit pt. MD stated that pt will be discharged tomorrow if cleared by by 
Cardiology. No result yet of cardiolite stress test noted at this time.

## 2019-10-25 NOTE — NUR
Phoned Dr. Magallon and talked to MD and he stated that cardiolite stress test was negative and 
pt can be discharged tomorrow.

## 2019-10-25 NOTE — NUR
Social Service consult per protocol use of drugs. Pt is alert and oriented times 3. Pt is 
somewhat slow to answer questions. Pt states she does not use drugs on a regular basis. Pt 
states she only uses meth when she goes to [place] to visit her sister although it has been 
some time that she has

seen her. Pt states she does not use meth when she comes back home. Pt states she is not 
addicted to it. Ms Caldwell was given a packet of resources which she willingly took.

## 2019-10-25 NOTE — NUR
assessment

Patient is a 69 year old female who is alert and oriented. Patients cognitive abilities are 
intact. Prior to admission patient lived home with family and functioned independently. 
Patient informed me she is able to care for her own ADLs.  Per patient she will return home 
to her prior living arrangements post discharge and family will transport her home. Patient 
was positive for amphetamines. Patient informed me she does meth here and there. Patient has 
accepted resources from [facility] for inpatient and outpatient substance abuse rehabs. 
Patient has no other discharge needs at this time.  I informed patient she has a right to 
speak to a  regarding all care. I informed patient she has a right to 
participate in any and all discharge planning. Patient does not have a POA and advanced 
directive. I have offered patient information on POA and advanced directives. I informed the 
patient the advantages and benefits of having an Advanced Directive. Patient verbalized 
understanding and agreed to discharge plan.

-------------------------------------------------------------------------------

Addendum: 10/25/19 at 1649 by Grazyna MORE

-------------------------------------------------------------------------------

Amended: Links added.

## 2019-10-26 VITALS — DIASTOLIC BLOOD PRESSURE: 63 MMHG | SYSTOLIC BLOOD PRESSURE: 118 MMHG

## 2019-10-26 VITALS — DIASTOLIC BLOOD PRESSURE: 70 MMHG | SYSTOLIC BLOOD PRESSURE: 156 MMHG

## 2019-10-26 VITALS — SYSTOLIC BLOOD PRESSURE: 132 MMHG | DIASTOLIC BLOOD PRESSURE: 61 MMHG

## 2019-10-26 VITALS — DIASTOLIC BLOOD PRESSURE: 77 MMHG | SYSTOLIC BLOOD PRESSURE: 132 MMHG

## 2019-10-26 VITALS — DIASTOLIC BLOOD PRESSURE: 58 MMHG | SYSTOLIC BLOOD PRESSURE: 127 MMHG

## 2019-10-26 RX ADMIN — PROPRANOLOL HYDROCHLORIDE SCH MG: 20 TABLET ORAL at 10:23

## 2019-10-26 RX ADMIN — FUROSEMIDE SCH MG: 20 TABLET ORAL at 10:24

## 2019-10-26 RX ADMIN — FAMOTIDINE SCH MG: 20 TABLET, FILM COATED ORAL at 10:24

## 2019-10-26 RX ADMIN — Medication SCH ML: at 10:25

## 2019-10-26 RX ADMIN — POTASSIUM CHLORIDE SCH MEQ: 1500 TABLET, EXTENDED RELEASE ORAL at 10:23

## 2019-10-26 NOTE — NUR
Discharge instructions given as ordered. Encourage to follow up with PMD as instructed. All 
questions and concerns addressed. Patient verbalized understanding.  Medication 
reconciliation form completed and copy given to patient.  IV removed with catheter intact, 
pressure dressing applied, archuleta catheter KEPT AS PER md'S INSTRUCTIONS.  Telemetry unit 
returned to ICU. Patient taken to vehicle via wheelchair with all personal belongings, 
accompanied by staff and family member. No distress noted at time of departure.

## 2019-10-26 NOTE — NUR
ASKED DR BARBIE BUTTS IF WE ARE GOING TO REMOVE THE TORRES CATHETER AND MD STATED AND PT WILL BE 
GOING HOME WITH IT.

## 2019-10-26 NOTE — NUR
DR. BUTTS WAS IN TO SEE PT AND MD LEFT PT'S DISCHARGE ORDER AND PRESCRIPTIONS AND PT AWARE 
OF SAME.

## 2019-10-26 NOTE — NUR
ASSESSED PT FOR THE REQUIREMENT OF MED NEB PRN TX. PT ON RA AND IS SLEEPING AT THIS TIME. NO 
DISTRESS NOTED. MED NEB NOT INDICATED. WILL CONTINUE TO MONITOR PT.

## 2019-11-03 ENCOUNTER — HOSPITAL ENCOUNTER (EMERGENCY)
Dept: HOSPITAL 15 - ER | Age: 69
Discharge: HOME | End: 2019-11-03
Payer: COMMERCIAL

## 2019-11-03 VITALS — SYSTOLIC BLOOD PRESSURE: 167 MMHG | DIASTOLIC BLOOD PRESSURE: 80 MMHG

## 2019-11-03 VITALS — HEIGHT: 66 IN | WEIGHT: 230 LBS | BODY MASS INDEX: 36.96 KG/M2
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
## 2019-11-03 DIAGNOSIS — I13.0: ICD-10-CM

## 2019-11-03 DIAGNOSIS — T83.84XA: Primary | ICD-10-CM

## 2019-11-03 DIAGNOSIS — N18.9: ICD-10-CM

## 2019-11-03 DIAGNOSIS — Z79.899: ICD-10-CM

## 2019-11-03 DIAGNOSIS — F17.210: ICD-10-CM

## 2019-11-03 DIAGNOSIS — I50.9: ICD-10-CM

## 2019-11-03 DIAGNOSIS — J44.9: ICD-10-CM
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
## 2019-11-03 DIAGNOSIS — N39.0: ICD-10-CM

## 2019-11-03 PROCEDURE — 81001 URINALYSIS AUTO W/SCOPE: CPT

## 2019-11-03 PROCEDURE — 96372 THER/PROPH/DIAG INJ SC/IM: CPT

## 2019-11-03 PROCEDURE — 99283 EMERGENCY DEPT VISIT LOW MDM: CPT
